# Patient Record
Sex: FEMALE | Race: BLACK OR AFRICAN AMERICAN | NOT HISPANIC OR LATINO
[De-identification: names, ages, dates, MRNs, and addresses within clinical notes are randomized per-mention and may not be internally consistent; named-entity substitution may affect disease eponyms.]

---

## 2017-12-21 PROBLEM — Z00.00 ENCOUNTER FOR PREVENTIVE HEALTH EXAMINATION: Status: ACTIVE | Noted: 2017-12-21

## 2017-12-28 ENCOUNTER — APPOINTMENT (OUTPATIENT)
Dept: ANTEPARTUM | Facility: CLINIC | Age: 27
End: 2017-12-28
Payer: COMMERCIAL

## 2017-12-28 ENCOUNTER — ASOB RESULT (OUTPATIENT)
Age: 27
End: 2017-12-28

## 2017-12-28 PROCEDURE — 76817 TRANSVAGINAL US OBSTETRIC: CPT

## 2017-12-28 PROCEDURE — 76811 OB US DETAILED SNGL FETUS: CPT

## 2018-01-11 ENCOUNTER — ASOB RESULT (OUTPATIENT)
Age: 28
End: 2018-01-11

## 2018-01-11 ENCOUNTER — APPOINTMENT (OUTPATIENT)
Dept: ANTEPARTUM | Facility: CLINIC | Age: 28
End: 2018-01-11
Payer: COMMERCIAL

## 2018-01-11 DIAGNOSIS — E66.01 OBESITY COMPLICATING PREGNANCY, SECOND TRIMESTER: ICD-10-CM

## 2018-01-11 DIAGNOSIS — O99.212 OBESITY COMPLICATING PREGNANCY, SECOND TRIMESTER: ICD-10-CM

## 2018-01-11 PROCEDURE — 76816 OB US FOLLOW-UP PER FETUS: CPT

## 2018-02-09 ENCOUNTER — APPOINTMENT (OUTPATIENT)
Dept: PEDIATRIC CARDIOLOGY | Facility: CLINIC | Age: 28
End: 2018-02-09
Payer: COMMERCIAL

## 2018-02-09 ENCOUNTER — OUTPATIENT (OUTPATIENT)
Dept: OUTPATIENT SERVICES | Age: 28
LOS: 1 days | Discharge: HOME | End: 2018-02-09

## 2018-02-09 PROCEDURE — 76825 ECHO EXAM OF FETAL HEART: CPT

## 2018-02-09 PROCEDURE — 99241 OFFICE CONSULTATION NEW/ESTAB PATIENT 15 MIN: CPT | Mod: 25

## 2018-02-09 PROCEDURE — 93325 DOPPLER ECHO COLOR FLOW MAPG: CPT | Mod: 59

## 2018-02-09 PROCEDURE — 76827 ECHO EXAM OF FETAL HEART: CPT

## 2018-02-09 PROCEDURE — 76820 UMBILICAL ARTERY ECHO: CPT

## 2018-03-23 ENCOUNTER — ASOB RESULT (OUTPATIENT)
Age: 28
End: 2018-03-23

## 2018-03-23 ENCOUNTER — APPOINTMENT (OUTPATIENT)
Dept: ANTEPARTUM | Facility: CLINIC | Age: 28
End: 2018-03-23
Payer: COMMERCIAL

## 2018-03-23 PROCEDURE — 76820 UMBILICAL ARTERY ECHO: CPT

## 2018-03-23 PROCEDURE — 76816 OB US FOLLOW-UP PER FETUS: CPT

## 2018-03-26 ENCOUNTER — APPOINTMENT (OUTPATIENT)
Dept: MATERNAL FETAL MEDICINE | Facility: CLINIC | Age: 28
End: 2018-03-26

## 2018-03-26 ENCOUNTER — APPOINTMENT (OUTPATIENT)
Dept: ANTEPARTUM | Facility: CLINIC | Age: 28
End: 2018-03-26
Payer: COMMERCIAL

## 2018-03-26 ENCOUNTER — ASOB RESULT (OUTPATIENT)
Age: 28
End: 2018-03-26

## 2018-03-26 DIAGNOSIS — Z86.2 PERSONAL HISTORY OF DISEASES OF THE BLOOD AND BLOOD-FORMING ORGANS AND CERTAIN DISORDERS INVOLVING THE IMMUNE MECHANISM: ICD-10-CM

## 2018-03-26 DIAGNOSIS — Z34.03 ENCOUNTER FOR SUPERVISION OF NORMAL FIRST PREGNANCY, THIRD TRIMESTER: ICD-10-CM

## 2018-03-26 DIAGNOSIS — Z78.9 OTHER SPECIFIED HEALTH STATUS: ICD-10-CM

## 2018-03-26 DIAGNOSIS — O99.019 ANEMIA COMPLICATING PREGNANCY, UNSPECIFIED TRIMESTER: ICD-10-CM

## 2018-03-26 DIAGNOSIS — O41.00X0 OLIGOHYDRAMNIOS, UNSPECIFIED TRIMESTER, NOT APPLICABLE OR UNSPECIFIED: ICD-10-CM

## 2018-03-26 PROCEDURE — 76818 FETAL BIOPHYS PROFILE W/NST: CPT

## 2018-03-26 RX ORDER — FERROUS SULFATE 325(65) MG
325 (65 FE) TABLET ORAL DAILY
Refills: 0 | Status: ACTIVE | COMMUNITY
Start: 2018-03-26

## 2018-04-02 ENCOUNTER — APPOINTMENT (OUTPATIENT)
Dept: ANTEPARTUM | Facility: CLINIC | Age: 28
End: 2018-04-02
Payer: COMMERCIAL

## 2018-04-02 ENCOUNTER — ASOB RESULT (OUTPATIENT)
Age: 28
End: 2018-04-02

## 2018-04-02 ENCOUNTER — APPOINTMENT (OUTPATIENT)
Dept: MATERNAL FETAL MEDICINE | Facility: CLINIC | Age: 28
End: 2018-04-02

## 2018-04-02 PROCEDURE — 76818 FETAL BIOPHYS PROFILE W/NST: CPT

## 2024-05-09 ENCOUNTER — APPOINTMENT (OUTPATIENT)
Dept: ANTEPARTUM | Facility: CLINIC | Age: 34
End: 2024-05-09
Payer: COMMERCIAL

## 2024-05-09 ENCOUNTER — LABORATORY RESULT (OUTPATIENT)
Age: 34
End: 2024-05-09

## 2024-05-09 ENCOUNTER — ASOB RESULT (OUTPATIENT)
Age: 34
End: 2024-05-09

## 2024-05-09 PROCEDURE — 36415 COLL VENOUS BLD VENIPUNCTURE: CPT

## 2024-05-09 PROCEDURE — 76813 OB US NUCHAL MEAS 1 GEST: CPT

## 2024-05-09 PROCEDURE — 76801 OB US < 14 WKS SINGLE FETUS: CPT

## 2024-07-05 ENCOUNTER — APPOINTMENT (OUTPATIENT)
Dept: ANTEPARTUM | Facility: CLINIC | Age: 34
End: 2024-07-05
Payer: COMMERCIAL

## 2024-07-05 ENCOUNTER — ASOB RESULT (OUTPATIENT)
Age: 34
End: 2024-07-05

## 2024-07-05 PROCEDURE — 76811 OB US DETAILED SNGL FETUS: CPT

## 2024-08-23 ENCOUNTER — ASOB RESULT (OUTPATIENT)
Age: 34
End: 2024-08-23

## 2024-08-23 ENCOUNTER — APPOINTMENT (OUTPATIENT)
Dept: ANTEPARTUM | Facility: CLINIC | Age: 34
End: 2024-08-23

## 2024-08-23 PROCEDURE — 76816 OB US FOLLOW-UP PER FETUS: CPT

## 2024-08-23 PROCEDURE — 76819 FETAL BIOPHYS PROFIL W/O NST: CPT | Mod: 59

## 2024-09-19 ENCOUNTER — APPOINTMENT (OUTPATIENT)
Dept: ANTEPARTUM | Facility: CLINIC | Age: 34
End: 2024-09-19

## 2024-10-17 ENCOUNTER — APPOINTMENT (OUTPATIENT)
Dept: ANTEPARTUM | Facility: CLINIC | Age: 34
End: 2024-10-17

## 2024-10-17 ENCOUNTER — OUTPATIENT (OUTPATIENT)
Dept: INPATIENT UNIT | Facility: HOSPITAL | Age: 34
LOS: 1 days | Discharge: HOME | End: 2024-10-17
Payer: COMMERCIAL

## 2024-10-17 VITALS
HEART RATE: 89 BPM | DIASTOLIC BLOOD PRESSURE: 82 MMHG | RESPIRATION RATE: 16 BRPM | SYSTOLIC BLOOD PRESSURE: 142 MMHG | TEMPERATURE: 98 F

## 2024-10-17 VITALS — HEART RATE: 79 BPM | DIASTOLIC BLOOD PRESSURE: 88 MMHG | SYSTOLIC BLOOD PRESSURE: 134 MMHG

## 2024-10-17 DIAGNOSIS — O26.899 OTHER SPECIFIED PREGNANCY RELATED CONDITIONS, UNSPECIFIED TRIMESTER: ICD-10-CM

## 2024-10-17 DIAGNOSIS — Z98.84 BARIATRIC SURGERY STATUS: Chronic | ICD-10-CM

## 2024-10-17 LAB
ALBUMIN SERPL ELPH-MCNC: 3.2 G/DL — LOW (ref 3.3–5)
ALP SERPL-CCNC: 85 U/L — SIGNIFICANT CHANGE UP (ref 40–120)
ALT FLD-CCNC: 9 U/L — SIGNIFICANT CHANGE UP (ref 4–33)
ANION GAP SERPL CALC-SCNC: 9 MMOL/L — SIGNIFICANT CHANGE UP (ref 7–14)
APPEARANCE UR: ABNORMAL
AST SERPL-CCNC: 20 U/L — SIGNIFICANT CHANGE UP (ref 4–32)
BACTERIA # UR AUTO: ABNORMAL /HPF
BASOPHILS # BLD AUTO: 0.03 K/UL — SIGNIFICANT CHANGE UP (ref 0–0.2)
BASOPHILS NFR BLD AUTO: 0.5 % — SIGNIFICANT CHANGE UP (ref 0–2)
BILIRUB SERPL-MCNC: <0.2 MG/DL — SIGNIFICANT CHANGE UP (ref 0.2–1.2)
BILIRUB UR-MCNC: NEGATIVE — SIGNIFICANT CHANGE UP
BUN SERPL-MCNC: 6 MG/DL — LOW (ref 7–23)
CALCIUM SERPL-MCNC: 8.5 MG/DL — SIGNIFICANT CHANGE UP (ref 8.4–10.5)
CAST: 0 /LPF — SIGNIFICANT CHANGE UP (ref 0–4)
CHLORIDE SERPL-SCNC: 103 MMOL/L — SIGNIFICANT CHANGE UP (ref 98–107)
CO2 SERPL-SCNC: 22 MMOL/L — SIGNIFICANT CHANGE UP (ref 22–31)
COLOR SPEC: YELLOW — SIGNIFICANT CHANGE UP
CREAT ?TM UR-MCNC: 62 MG/DL — SIGNIFICANT CHANGE UP
CREAT SERPL-MCNC: 0.66 MG/DL — SIGNIFICANT CHANGE UP (ref 0.5–1.3)
DIFF PNL FLD: NEGATIVE — SIGNIFICANT CHANGE UP
EGFR: 117 ML/MIN/1.73M2 — SIGNIFICANT CHANGE UP
EOSINOPHIL # BLD AUTO: 0.06 K/UL — SIGNIFICANT CHANGE UP (ref 0–0.5)
EOSINOPHIL NFR BLD AUTO: 1 % — SIGNIFICANT CHANGE UP (ref 0–6)
GLUCOSE SERPL-MCNC: 65 MG/DL — LOW (ref 70–99)
GLUCOSE UR QL: NEGATIVE MG/DL — SIGNIFICANT CHANGE UP
HCT VFR BLD CALC: 26.4 % — LOW (ref 34.5–45)
HGB BLD-MCNC: 8.4 G/DL — LOW (ref 11.5–15.5)
IANC: 3.67 K/UL — SIGNIFICANT CHANGE UP (ref 1.8–7.4)
IMM GRANULOCYTES NFR BLD AUTO: 0.2 % — SIGNIFICANT CHANGE UP (ref 0–0.9)
KETONES UR-MCNC: NEGATIVE MG/DL — SIGNIFICANT CHANGE UP
LDH SERPL L TO P-CCNC: 228 U/L — HIGH (ref 135–225)
LEUKOCYTE ESTERASE UR-ACNC: NEGATIVE — SIGNIFICANT CHANGE UP
LYMPHOCYTES # BLD AUTO: 1.78 K/UL — SIGNIFICANT CHANGE UP (ref 1–3.3)
LYMPHOCYTES # BLD AUTO: 29.6 % — SIGNIFICANT CHANGE UP (ref 13–44)
MCHC RBC-ENTMCNC: 27 PG — SIGNIFICANT CHANGE UP (ref 27–34)
MCHC RBC-ENTMCNC: 31.8 GM/DL — LOW (ref 32–36)
MCV RBC AUTO: 84.9 FL — SIGNIFICANT CHANGE UP (ref 80–100)
MONOCYTES # BLD AUTO: 0.46 K/UL — SIGNIFICANT CHANGE UP (ref 0–0.9)
MONOCYTES NFR BLD AUTO: 7.7 % — SIGNIFICANT CHANGE UP (ref 2–14)
NEUTROPHILS # BLD AUTO: 3.67 K/UL — SIGNIFICANT CHANGE UP (ref 1.8–7.4)
NEUTROPHILS NFR BLD AUTO: 61 % — SIGNIFICANT CHANGE UP (ref 43–77)
NITRITE UR-MCNC: NEGATIVE — SIGNIFICANT CHANGE UP
NRBC # BLD: 0 /100 WBCS — SIGNIFICANT CHANGE UP (ref 0–0)
NRBC # FLD: 0 K/UL — SIGNIFICANT CHANGE UP (ref 0–0)
PH UR: 7 — SIGNIFICANT CHANGE UP (ref 5–8)
PLATELET # BLD AUTO: 253 K/UL — SIGNIFICANT CHANGE UP (ref 150–400)
POTASSIUM SERPL-MCNC: 3.5 MMOL/L — SIGNIFICANT CHANGE UP (ref 3.5–5.3)
POTASSIUM SERPL-SCNC: 3.5 MMOL/L — SIGNIFICANT CHANGE UP (ref 3.5–5.3)
PROT ?TM UR-MCNC: 6 MG/DL — SIGNIFICANT CHANGE UP
PROT SERPL-MCNC: 6.2 G/DL — SIGNIFICANT CHANGE UP (ref 6–8.3)
PROT UR-MCNC: NEGATIVE MG/DL — SIGNIFICANT CHANGE UP
PROT/CREAT UR-RTO: 0.1 RATIO — SIGNIFICANT CHANGE UP (ref 0–0.2)
RBC # BLD: 3.11 M/UL — LOW (ref 3.8–5.2)
RBC # FLD: 15.3 % — HIGH (ref 10.3–14.5)
RBC CASTS # UR COMP ASSIST: 0 /HPF — SIGNIFICANT CHANGE UP (ref 0–4)
SODIUM SERPL-SCNC: 134 MMOL/L — LOW (ref 135–145)
SP GR SPEC: 1.01 — SIGNIFICANT CHANGE UP (ref 1–1.03)
SQUAMOUS # UR AUTO: 8 /HPF — HIGH (ref 0–5)
URATE SERPL-MCNC: 3.8 MG/DL — SIGNIFICANT CHANGE UP (ref 2.5–7)
UROBILINOGEN FLD QL: 0.2 MG/DL — SIGNIFICANT CHANGE UP (ref 0.2–1)
WBC # BLD: 6.01 K/UL — SIGNIFICANT CHANGE UP (ref 3.8–10.5)
WBC # FLD AUTO: 6.01 K/UL — SIGNIFICANT CHANGE UP (ref 3.8–10.5)
WBC UR QL: 2 /HPF — SIGNIFICANT CHANGE UP (ref 0–5)

## 2024-10-17 PROCEDURE — 59025 FETAL NON-STRESS TEST: CPT | Mod: 26

## 2024-10-17 PROCEDURE — 99221 1ST HOSP IP/OBS SF/LOW 40: CPT | Mod: 25

## 2024-10-17 NOTE — OB PROVIDER TRIAGE NOTE - NSOBPROVIDERNOTE_OBGYN_ALL_OB_FT
[facility] patient is a 33 y/o EDC 2024 EGA 36   encounter for elevated blood pressure in office [facility] patient is a 35 y/o EDC 2024 EGA 36   encounter for elevated blood pressure in office     1600p received report from LINDA Villarreal   [facility] labs normal   BP  no severe range BP    awaiting  call back from Dr Peralta    BP monitoring and observation ''    1715 p d/w Dr Peralta   cleared for [facility] patient is a 33 y/o EDC 2024 EGA 36   encounter for elevated blood pressure in office     1600p received report from LINDA Coon labs normal   BP  no severe range BP    awaiting  call back from Dr Peralta    BP monitoring and observation '    1715 p d/w Dr Peralta   cleared for discharge   instructed to do 24 U collection     - Patient to be discharged home with follow up and return precautions  - Please follow up with your obstetrician at your next scheduled appointment.   - Please return for decreased/no fetal movement, vaginal bleeding similar to that of a period, leaking/gush of fluid, regular contractions occurring 4-5 minutes for one hour or requiring pain medication   - Patient and partner and educated of plan and demonstrate understanding. All questions answered. Discharge instructions provided and signed.   - Discharged at ___ 1560p

## 2024-10-17 NOTE — OB PROVIDER TRIAGE NOTE - HISTORY OF PRESENT ILLNESS
[facility] patient is a 35 y/o EDC 2024 EGA 36   reports of elevated blood pressure in the office this morning. Patient denies headache, visual disturbances, nausea/vomiting, epigastric pain. Patient reports of fetal movement. Denies loss of fluid, vaginal bleeding, cramping, contractions.     Antepartum History:  - Anemia- Iron infusions x 5  ( >4 weeks)

## 2024-10-17 NOTE — OB PROVIDER TRIAGE NOTE - NSHPLABSRESULTS_GEN_ALL_CORE
8.4    6.01  )-----------( 253      ( 17 Oct 2024 13:50 )             26.4     10-17    134[L]  |  103  |  6[L]  ----------------------------<  65[L]  3.5   |  22  |  0.66    Ca    8.5      17 Oct 2024 13:50    TPro  6.2  /  Alb  3.2[L]  /  TBili  <0.2  /  DBili  x   /  AST  20  /  ALT  9   /  AlkPhos  85  10-17    PCR.1

## 2024-10-17 NOTE — OB RN TRIAGE NOTE - FALL HARM RISK - CONCLUSION
utilizes the maternal blood to test for fetal cells. These fetal cells are then karyotyped for genetic evaluation. All of these tests, CVS, NIPT and amniocentesis, let couples know if the fetus will have genetic abnormalities, and will help them make informed decisions regarding their pregnancy. Karyotyping by either CVS, NIPT or Amniocentesis is the ONLY way to confirm the genetic chromosomal structure regarding trisomy; Down's Syndrome, Edward's or Pateau's. [facility] was reviewed. If NIPT is positive then a confirmatory amniocentesis would be recommended to confirm the diagnosis. [facility] guidelines were reviewed. Spontaneous  10/18/2022     Priority: High    Family history of MTHFR deficiency 2021     Priority: High    Polycystic ovaries 2023     Priority: Medium    Hx of cold sores 2023     Priority: Medium    39 weeks gestation of pregnancy 2023    Family history of Downs syndrome 2023     FOB 1st cousin        MTHFR mutation Heterozygous C665T and [id number] 2021     MTHFR Mutation C665T Abnormal  2021 12:36 PM [facility]   Heterozygous    MTHFR Mutation C6990B Abnormal  2021 12:36 PM [facility]   Heterozygous               Plan discussed with Dr. Brant Marquez, who is agreeable. Steroids given this admission: No    Risks, benefits, alternatives and possible complications have been discussed in detail with the patient. Admission, and post admission procedures and expectations were discussed in detail. All questions were answered. Attending's Name: Dr. Nicko Sidhu DO  Ob/Gyn Resident  2023, 10:44 PM    Resident Physician Statement  I have discussed the case, including pertinent history and exam findings with the above chuyita resident. I have personally seen the patient. I agree with the assessment, plan and orders as documented. I have made changes to the above note as needed. I have discussed the case with above named attending.      [facility]
Universal Safety Interventions

## 2024-10-17 NOTE — OB PROVIDER TRIAGE NOTE - NSHPPHYSICALEXAM_GEN_ALL_CORE
T(C): 36.8 (10-17-24 @ 13:46), Max: 36.8 (10-17-24 @ 13:29)  HR: 82 (10-17-24 @ 14:23) (82 - 89)  BP: 124/71 (10-17-24 @ 14:23) (124/71 - 142/82)  RR: 16 (10-17-24 @ 13:46) (16 - 16)    General: Female sitting comfortably   Head: Normocephalic. Atraumatic.   Eyes: No discharge, lids normal, conjunctiva normal  Lungs: No respiratory distress  Abdomen: Soft, nontender. Gravid. No contractions on palpation  TAUS:  Sono saved in ASOB.   NST:   CTX: none  Neuro: No facial asymmetry, no slurred speech, moves all 4 extremities  Mood: Alert and lucid, appropriate mood and affect  SSE: Vital Signs Last 24 Hrs  T(C): 36.8 (17 Oct 2024 13:46), Max: 36.8 (17 Oct 2024 13:29)  T(F): 98.24 (17 Oct 2024 13:46), Max: 98.24 (17 Oct 2024 13:46)  HR: 85 (17 Oct 2024 15:24) (82 - 89)  BP: 132/90 (17 Oct 2024 15:24) (124/71 - 146/83)  RR: 16 (17 Oct 2024 13:46) (16 - 16)    General: Female sitting comfortably   Head: Normocephalic. Atraumatic.   Eyes: No discharge, lids normal, conjunctiva normal  Lungs: No respiratory distress  Abdomen: Soft, nontender. Gravid. No contractions on palpation  TAUS:  Sono saved in ASOB.   NST: 140 baseline with moderate variability, accelerations present, no decelerations, reactive   CTX: none  Neuro: No facial asymmetry, no slurred speech, moves all 4 extremities  Mood: Alert and lucid, appropriate mood and affect Vital Signs Last 24 Hrs  T(C): 36.8 (17 Oct 2024 13:46), Max: 36.8 (17 Oct 2024 13:29)  T(F): 98.24 (17 Oct 2024 13:46), Max: 98.24 (17 Oct 2024 13:46)  HR: 85 (17 Oct 2024 15:24) (82 - 89)  BP: 132/90 (17 Oct 2024 15:24) (124/71 - 146/83)  RR: 16 (17 Oct 2024 13:46) (16 - 16)    General: Female sitting comfortably   Head: Normocephalic. Atraumatic.   Eyes: No discharge, lids normal, conjunctiva normal  Lungs: No respiratory distress  Abdomen: Soft, nontender. Gravid. No contractions on palpation  TAUS:  Sono saved in ASOB. cephalic, posterior placenta, m-mode 147, ZAHIRA 13.33, 8/8 BPP  NST: 140 baseline with moderate variability, accelerations present, no decelerations, reactive   CTX: none  Neuro: No facial asymmetry, no slurred speech, moves all 4 extremities  Mood: Alert and lucid, appropriate mood and affect

## 2024-10-21 DIAGNOSIS — O09.523 SUPERVISION OF ELDERLY MULTIGRAVIDA, THIRD TRIMESTER: ICD-10-CM

## 2024-10-21 DIAGNOSIS — O13.3 GESTATIONAL [PREGNANCY-INDUCED] HYPERTENSION WITHOUT SIGNIFICANT PROTEINURIA, THIRD TRIMESTER: ICD-10-CM

## 2024-10-21 DIAGNOSIS — Z3A.36 36 WEEKS GESTATION OF PREGNANCY: ICD-10-CM

## 2024-10-21 DIAGNOSIS — O99.013 ANEMIA COMPLICATING PREGNANCY, THIRD TRIMESTER: ICD-10-CM

## 2024-10-21 DIAGNOSIS — D64.9 ANEMIA, UNSPECIFIED: ICD-10-CM

## 2024-10-21 DIAGNOSIS — O99.843 BARIATRIC SURGERY STATUS COMPLICATING PREGNANCY, THIRD TRIMESTER: ICD-10-CM

## 2024-10-28 ENCOUNTER — APPOINTMENT (OUTPATIENT)
Dept: ANTEPARTUM | Facility: CLINIC | Age: 34
End: 2024-10-28

## 2024-10-28 ENCOUNTER — INPATIENT (INPATIENT)
Facility: HOSPITAL | Age: 34
LOS: 3 days | Discharge: HOME | End: 2024-11-01
Attending: STUDENT IN AN ORGANIZED HEALTH CARE EDUCATION/TRAINING PROGRAM | Admitting: STUDENT IN AN ORGANIZED HEALTH CARE EDUCATION/TRAINING PROGRAM
Payer: COMMERCIAL

## 2024-10-28 VITALS
HEART RATE: 81 BPM | TEMPERATURE: 98 F | SYSTOLIC BLOOD PRESSURE: 183 MMHG | RESPIRATION RATE: 16 BRPM | DIASTOLIC BLOOD PRESSURE: 102 MMHG

## 2024-10-28 DIAGNOSIS — O26.899 OTHER SPECIFIED PREGNANCY RELATED CONDITIONS, UNSPECIFIED TRIMESTER: ICD-10-CM

## 2024-10-28 DIAGNOSIS — Z98.84 BARIATRIC SURGERY STATUS: Chronic | ICD-10-CM

## 2024-10-28 LAB
ALBUMIN SERPL ELPH-MCNC: 3.5 G/DL — SIGNIFICANT CHANGE UP (ref 3.3–5)
ALP SERPL-CCNC: 104 U/L — SIGNIFICANT CHANGE UP (ref 40–120)
ALT FLD-CCNC: 10 U/L — SIGNIFICANT CHANGE UP (ref 4–33)
ANION GAP SERPL CALC-SCNC: 14 MMOL/L — SIGNIFICANT CHANGE UP (ref 7–14)
APPEARANCE UR: ABNORMAL
AST SERPL-CCNC: 24 U/L — SIGNIFICANT CHANGE UP (ref 4–32)
BACTERIA # UR AUTO: ABNORMAL /HPF
BASOPHILS # BLD AUTO: 0.03 K/UL — SIGNIFICANT CHANGE UP (ref 0–0.2)
BASOPHILS NFR BLD AUTO: 0.5 % — SIGNIFICANT CHANGE UP (ref 0–2)
BILIRUB SERPL-MCNC: 0.2 MG/DL — SIGNIFICANT CHANGE UP (ref 0.2–1.2)
BILIRUB UR-MCNC: NEGATIVE — SIGNIFICANT CHANGE UP
BLD GP AB SCN SERPL QL: POSITIVE — SIGNIFICANT CHANGE UP
BUN SERPL-MCNC: 7 MG/DL — SIGNIFICANT CHANGE UP (ref 7–23)
CALCIUM SERPL-MCNC: 9 MG/DL — SIGNIFICANT CHANGE UP (ref 8.4–10.5)
CAST: 2 /LPF — SIGNIFICANT CHANGE UP (ref 0–4)
CHLORIDE SERPL-SCNC: 102 MMOL/L — SIGNIFICANT CHANGE UP (ref 98–107)
CO2 SERPL-SCNC: 22 MMOL/L — SIGNIFICANT CHANGE UP (ref 22–31)
COLOR SPEC: YELLOW — SIGNIFICANT CHANGE UP
CREAT ?TM UR-MCNC: 219 MG/DL — SIGNIFICANT CHANGE UP
CREAT SERPL-MCNC: 0.7 MG/DL — SIGNIFICANT CHANGE UP (ref 0.5–1.3)
DIFF PNL FLD: NEGATIVE — SIGNIFICANT CHANGE UP
EGFR: 115 ML/MIN/1.73M2 — SIGNIFICANT CHANGE UP
EOSINOPHIL # BLD AUTO: 0.04 K/UL — SIGNIFICANT CHANGE UP (ref 0–0.5)
EOSINOPHIL NFR BLD AUTO: 0.7 % — SIGNIFICANT CHANGE UP (ref 0–6)
GLUCOSE SERPL-MCNC: 88 MG/DL — SIGNIFICANT CHANGE UP (ref 70–99)
GLUCOSE UR QL: NEGATIVE MG/DL — SIGNIFICANT CHANGE UP
HCT VFR BLD CALC: 32 % — LOW (ref 34.5–45)
HGB BLD-MCNC: 10.1 G/DL — LOW (ref 11.5–15.5)
IANC: 3.9 K/UL — SIGNIFICANT CHANGE UP (ref 1.8–7.4)
IMM GRANULOCYTES NFR BLD AUTO: 0.2 % — SIGNIFICANT CHANGE UP (ref 0–0.9)
KETONES UR-MCNC: NEGATIVE MG/DL — SIGNIFICANT CHANGE UP
LDH SERPL L TO P-CCNC: 298 U/L — HIGH (ref 135–225)
LEUKOCYTE ESTERASE UR-ACNC: NEGATIVE — SIGNIFICANT CHANGE UP
LYMPHOCYTES # BLD AUTO: 1.54 K/UL — SIGNIFICANT CHANGE UP (ref 1–3.3)
LYMPHOCYTES # BLD AUTO: 26 % — SIGNIFICANT CHANGE UP (ref 13–44)
MCHC RBC-ENTMCNC: 26.9 PG — LOW (ref 27–34)
MCHC RBC-ENTMCNC: 31.6 GM/DL — LOW (ref 32–36)
MCV RBC AUTO: 85.3 FL — SIGNIFICANT CHANGE UP (ref 80–100)
MONOCYTES # BLD AUTO: 0.4 K/UL — SIGNIFICANT CHANGE UP (ref 0–0.9)
MONOCYTES NFR BLD AUTO: 6.8 % — SIGNIFICANT CHANGE UP (ref 2–14)
NEUTROPHILS # BLD AUTO: 3.9 K/UL — SIGNIFICANT CHANGE UP (ref 1.8–7.4)
NEUTROPHILS NFR BLD AUTO: 65.8 % — SIGNIFICANT CHANGE UP (ref 43–77)
NITRITE UR-MCNC: NEGATIVE — SIGNIFICANT CHANGE UP
NRBC # BLD: 0 /100 WBCS — SIGNIFICANT CHANGE UP (ref 0–0)
NRBC # FLD: 0 K/UL — SIGNIFICANT CHANGE UP (ref 0–0)
PH UR: 6.5 — SIGNIFICANT CHANGE UP (ref 5–8)
PLATELET # BLD AUTO: 304 K/UL — SIGNIFICANT CHANGE UP (ref 150–400)
POTASSIUM SERPL-MCNC: 3.6 MMOL/L — SIGNIFICANT CHANGE UP (ref 3.5–5.3)
POTASSIUM SERPL-SCNC: 3.6 MMOL/L — SIGNIFICANT CHANGE UP (ref 3.5–5.3)
PROT ?TM UR-MCNC: 24 MG/DL — SIGNIFICANT CHANGE UP
PROT SERPL-MCNC: 7 G/DL — SIGNIFICANT CHANGE UP (ref 6–8.3)
PROT UR-MCNC: SIGNIFICANT CHANGE UP MG/DL
PROT/CREAT UR-RTO: 0.1 RATIO — SIGNIFICANT CHANGE UP (ref 0–0.2)
RBC # BLD: 3.75 M/UL — LOW (ref 3.8–5.2)
RBC # FLD: 15.1 % — HIGH (ref 10.3–14.5)
RBC CASTS # UR COMP ASSIST: 2 /HPF — SIGNIFICANT CHANGE UP (ref 0–4)
REVIEW: SIGNIFICANT CHANGE UP
RH IG SCN BLD-IMP: NEGATIVE — SIGNIFICANT CHANGE UP
RH IG SCN BLD-IMP: NEGATIVE — SIGNIFICANT CHANGE UP
SODIUM SERPL-SCNC: 138 MMOL/L — SIGNIFICANT CHANGE UP (ref 135–145)
SP GR SPEC: 1.02 — SIGNIFICANT CHANGE UP (ref 1–1.03)
SQUAMOUS # UR AUTO: 26 /HPF — HIGH (ref 0–5)
URATE SERPL-MCNC: 4.1 MG/DL — SIGNIFICANT CHANGE UP (ref 2.5–7)
UROBILINOGEN FLD QL: 1 MG/DL — SIGNIFICANT CHANGE UP (ref 0.2–1)
WBC # BLD: 5.92 K/UL — SIGNIFICANT CHANGE UP (ref 3.8–10.5)
WBC # FLD AUTO: 5.92 K/UL — SIGNIFICANT CHANGE UP (ref 3.8–10.5)
WBC UR QL: 4 /HPF — SIGNIFICANT CHANGE UP (ref 0–5)

## 2024-10-28 PROCEDURE — 86077 PHYS BLOOD BANK SERV XMATCH: CPT

## 2024-10-28 RX ORDER — OXYTOCIN IN D5W-0.2% SODIUM CL 15/250 ML
167 PLASTIC BAG, INJECTION (ML) INTRAVENOUS
Qty: 30 | Refills: 0 | Status: COMPLETED | OUTPATIENT
Start: 2024-10-28 | End: 2024-10-28

## 2024-10-28 RX ORDER — CHLORHEXIDINE GLUCONATE 40 MG/ML
1 SOLUTION TOPICAL DAILY
Refills: 0 | Status: DISCONTINUED | OUTPATIENT
Start: 2024-10-28 | End: 2024-10-30

## 2024-10-28 RX ORDER — NIFEDIPINE 90 MG
10 TABLET, EXTENDED RELEASE 24 HR ORAL ONCE
Refills: 0 | Status: COMPLETED | OUTPATIENT
Start: 2024-10-28 | End: 2024-10-28

## 2024-10-28 RX ORDER — MAGNESIUM SULFATE IN 0.9% NACL 2 G/50 ML
2 INTRAVENOUS SOLUTION, PIGGYBACK (ML) INTRAVENOUS
Qty: 40 | Refills: 0 | Status: COMPLETED | OUTPATIENT
Start: 2024-10-28 | End: 2024-10-29

## 2024-10-28 RX ORDER — INFLUENZ VIR VAC TV P-SURF2003 15MCG/.5ML
0.5 SYRINGE (ML) INTRAMUSCULAR ONCE
Refills: 0 | Status: DISCONTINUED | OUTPATIENT
Start: 2024-10-28 | End: 2024-11-01

## 2024-10-28 RX ORDER — AMPICILLIN 2 G/1
2 INJECTION, POWDER, FOR SOLUTION INTRAVENOUS ONCE
Refills: 0 | Status: COMPLETED | OUTPATIENT
Start: 2024-10-28 | End: 2024-10-28

## 2024-10-28 RX ORDER — AMPICILLIN 2 G/1
1 INJECTION, POWDER, FOR SOLUTION INTRAVENOUS EVERY 4 HOURS
Refills: 0 | Status: DISCONTINUED | OUTPATIENT
Start: 2024-10-28 | End: 2024-10-30

## 2024-10-28 RX ORDER — CITRIC ACID/SODIUM CITRATE 334-500MG
15 SOLUTION, ORAL ORAL EVERY 6 HOURS
Refills: 0 | Status: DISCONTINUED | OUTPATIENT
Start: 2024-10-28 | End: 2024-10-30

## 2024-10-28 RX ORDER — NIFEDIPINE 90 MG
30 TABLET, EXTENDED RELEASE 24 HR ORAL DAILY
Refills: 0 | Status: DISCONTINUED | OUTPATIENT
Start: 2024-10-28 | End: 2024-10-30

## 2024-10-28 RX ORDER — MAGNESIUM SULFATE IN 0.9% NACL 2 G/50 ML
4 INTRAVENOUS SOLUTION, PIGGYBACK (ML) INTRAVENOUS ONCE
Refills: 0 | Status: COMPLETED | OUTPATIENT
Start: 2024-10-28 | End: 2024-10-28

## 2024-10-28 RX ADMIN — Medication 50 GM/HR: at 19:20

## 2024-10-28 RX ADMIN — Medication 30 MILLIGRAM(S): at 18:09

## 2024-10-28 RX ADMIN — Medication 125 MILLILITER(S): at 19:05

## 2024-10-28 RX ADMIN — Medication 300 GRAM(S): at 18:25

## 2024-10-28 RX ADMIN — Medication 50 GM/HR: at 18:52

## 2024-10-28 RX ADMIN — CHLORHEXIDINE GLUCONATE 1 APPLICATION(S): 40 SOLUTION TOPICAL at 21:30

## 2024-10-28 RX ADMIN — Medication 10 MILLIGRAM(S): at 18:06

## 2024-10-28 RX ADMIN — AMPICILLIN 200 GRAM(S): 2 INJECTION, POWDER, FOR SOLUTION INTRAVENOUS at 23:04

## 2024-10-28 NOTE — OB PROVIDER H&P - NSHPPHYSICALEXAM_GEN_ALL_CORE
Vital Signs Last 24 Hrs  T(C): 36.6 (28 Oct 2024 21:30), Max: 36.8 (28 Oct 2024 17:28)  T(F): 97.88 (28 Oct 2024 21:30), Max: 98.24 (28 Oct 2024 18:45)  HR: 91 (28 Oct 2024 22:02) (77 - 118)  BP: 167/96 (28 Oct 2024 21:54) (137/82 - 183/111)  BP(mean): --  RR: 18 (28 Oct 2024 21:30) (14 - 18)  SpO2: 93% (28 Oct 2024 22:02) (89% - 100%)    Parameters below as of 28 Oct 2024 18:31  Patient On (Oxygen Delivery Method): room air        SVE: 0/0/-3  FHT: 140 bpm/mod diana/+ accels/- decels   [place]: acontractile   Sono: Vertex

## 2024-10-28 NOTE — OB PROVIDER H&P - ASSESSMENT
INCOMPLETE NOTE     HPI: Pt is a 34y   @ 37w5d  presenting for X.  Fetal movement (+)  Leakage of fluid (-)  Contractions (-)  Vaginal bleeding (-)  GBS pos/neg  Estimated fetal weight:    Prenatal care complicated by     OBHx:  NSCD 2018 FT 5#_, IOL for gHTN   GynHx: Denies any gynecologic issues  PMHx: Anemia   PSHx: Bariatric surgery (),   Med: PNV  All: NKDA  Psych: Denies hx of mental health issues  SH: Denies hx of smoking, drinking, or drug usage during the pregnancy    Vital Signs Last 24 Hrs  T(C): 36.6 (28 Oct 2024 21:30), Max: 36.8 (28 Oct 2024 17:28)  T(F): 97.88 (28 Oct 2024 21:30), Max: 98.24 (28 Oct 2024 18:45)  HR: 91 (28 Oct 2024 22:02) (77 - 118)  BP: 167/96 (28 Oct 2024 21:54) (137/82 - 183/111)  BP(mean): --  RR: 18 (28 Oct 2024 21:30) (14 - 18)  SpO2: 93% (28 Oct 2024 22:02) (89% - 100%)    Parameters below as of 28 Oct 2024 18:31  Patient On (Oxygen Delivery Method): room air        SVE: 0/0/-3  FHT: 140 bpm/mod diana/+ accels/- decels   Accident: acontractile   Sono: Vertex    A/P: Pt is a 34y  @ 37w5d who presents for IOL for PEC->sPEC on admission with sustained severe range blood pressures     1. Admit to Labor and Delivery. Routine Labs. IV Fluids  2. IOL with BC/CB  3. Fetus: Vertex, Reactive/Continue fetal monitoring  4. Mg for seizure prophylaxis, Pro 30 QD, s/p Pro 10 IR (10/28)  5. GBS pos, for Amp  6. Pain: IV pain meds/epidural PRN    Ashley Sacks, PGY 1  Obstetrics and Gynecology    Discussed with Dr. Roberts        A/P: Pt is a 34y  @ 37w5d who presents for IOL for PEC->sPEC on admission with sustained severe range blood pressures     1. Admit to Labor and Delivery. Routine Labs. IV Fluids  2. IOL with BC/CB  3. Fetus: Vertex, Reactive/Continue fetal monitoring  4. Mg for seizure prophylaxis, Pro 30 QD, s/p Pro 10 IR (10/28)  5. GBS pos, for Amp  6. Pain: IV pain meds/epidural PRN    Ashley Sacks, PGY 1  Obstetrics and Gynecology    Discussed with Dr. Roberts        A/P: Pt is a 34y  @ 37w5d who presents for IOL for gHTN->sPEC on admission with sustained severe range blood pressures     1. Admit to Labor and Delivery. Routine/HELLP Labs. IV Fluids  2. IOL with BC/CB  3. Fetus: Vertex, Reactive/Continue fetal monitoring  4. Mg for seizure prophylaxis, Pro 30 QD, s/p Pro 10 IR (10/28)  5. GBS pos, for Amp  6. Pain: IV pain meds/epidural PRN  7. 2u on hold     Ashley Sacks, PGY 1  Obstetrics and Gynecology    Discussed with Dr. Roberts

## 2024-10-28 NOTE — PROVIDER CONTACT NOTE (OTHER) - BACKGROUND
IOL for proteinuria - elevated BP in waiting room and met criteria for severe preeclampsia.   Currently on Magnesium Sulfate infusion  received Procardia 10 IR at 1806 and 30 Procardia ER at 1809

## 2024-10-28 NOTE — OB RN TRIAGE NOTE - NSSDOHTHREATEN_OBGYN_A_OB
never Quality 226: Preventive Care And Screening: Tobacco Use: Screening And Cessation Intervention: Patient screened for tobacco use and is an ex/non-smoker Detail Level: Detailed Quality 130: Documentation Of Current Medications In The Medical Record: Current Medications Documented Quality 110: Preventive Care And Screening: Influenza Immunization: Influenza immunization was not ordered or administered, reason not given

## 2024-10-28 NOTE — OB PROVIDER H&P - NS_EFW_OBGYN_ALL_OB_FT
Hypoglycemia (Low Blood Sugar)     Fast-acting sugar includes a cup of nonfat milk.     Too little sugar (glucose) in your blood is called hypoglycemia or low blood sugar. Low blood sugar usually means anything lower than 70 mg/dL. Talk with your healthcare provider about your target range and what level is too low for you. Diabetes itself doesnt cause low blood sugar. But some of the treatments for diabetes, such as pills or insulin, may raise your risk for it. Low blood sugar may cause you to pass out or have a seizure. So always treat low blood sugar right away, but don't overeat.  Special note: Always carry a source of fast-acting sugar and a snack in case of hypoglycemia.   What you may notice  If you have low blood sugar, you may have one or more of these symptoms:  · Shakiness or dizziness  · Cold, clammy skin or sweating  · Feelings of hunger  · Headache  · Nervousness  · A hard, fast heartbeat  · Weakness  · Confusion or irritability  · Blurred vision  · Having nightmares or waking up confused or sweating  · Numbness or tingling in the lips or tongue  What you should do  Here are tips to follow if you have hypoglycemia:   · First check your blood sugar. If it is too low (out of your target range), eat or drink 15 to 20 grams of fast-acting sugar. This may be 3 to 4 glucose tablets, 4 ounces (half a cup) of fruit juice or regular (nondiet) soda, 8 ounces (1 cup) of fat-free milk, or 1 tablespoon of honey. Dont take more than this, or your blood sugar may go too high.  · Wait 15 minutes. Then recheck your blood sugar if you can.  · If your blood sugar is still too low, repeat the steps above and check your blood sugar again. If your blood sugar still has not returned to your target range, contact your healthcare provider or seek emergency care.  · Once your blood sugar returns to target range, eat a snack or meal.  Preventing low blood sugar  Things you can do include the following:   · If your condition  needs a strict treatment plan, eat your meals and snacks at the same times each day. Dont skip meals!  · If your treatment plan lets you change when you eat and what you eat, learn how to change the time and dose of your rapid-acting insulin to match this.   · Ask your healthcare provider if it is safe for you to drink alcohol. Never drink on an empty stomach.  · Take your medicine at the prescribed times.  · Always carry a source of fast-acting sugar and a snack when youre away from home.  Other things to do  Additional tips include the following:  · Carry a medical ID card, a compact USB drive, or wear a medical alert bracelet or necklace. It should say that you have diabetes. It should also say what to do if you pass out or have a seizure.  · Make sure your family, friends, and coworkers know the signs of low blood sugar. Tell them what to do if your blood sugar falls very low and you cant treat yourself.  · Keep a glucagon emergency kit handy. Be sure your family, friends, and coworkers know how and when to use it. Check it regularly and replace the glucagon before it expires.  · Talk with your health care team about other things you can do to prevent low blood sugar.     If you have unexplained hypoglycemia or hypoglycemia several times, call your healthcare provider.   Date Last Reviewed: 5/1/2016  © 5967-2915 [employer]. [address] [place] [address], [place] [address]. All rights reserved. This information is not intended as a substitute for professional medical care. Always follow your healthcare professional's instructions.         [id number]

## 2024-10-28 NOTE — OB PROVIDER H&P - HISTORY OF PRESENT ILLNESS
INCOMPLETE NOTE     HPI: Pt is a 34y   @ 37w5d  presenting for IOL for proteinuria -> met criteria for sPEC on admission.  Fetal movement (+)  Leakage of fluid (-)  Contractions (-)  Vaginal bleeding (-)  GBS pos  Estimated fetal weight: 2720    Prenatal care complicated by:  1. proteinuria -> sPEC on admission by severe range blood pressures   2. anemia -> s/p iron infusions x4, last in , now taking PO iron   3. AMA     OBHx:  NSCD 2018 FT 5#X, IOL for gHTN   GynHx: Denies any gynecologic issues  PMHx: Anemia   PSHx: Bariatric surgery ()  Med: PNV, bASA, Fe  All: NKDA  Psych: Denies hx of mental health issues  SH: Denies hx of smoking, drinking, or drug usage during the pregnancy       HPI: Pt is a 34y   @ 37w5d  presenting for IOL for gHTN-> met criteria for sPEC on admission.  Fetal movement (+)  Leakage of fluid (-)  Contractions (-)  Vaginal bleeding (-)  GBS pos  Estimated fetal weight: 2950    Prenatal care complicated by:  1. gHTN-> sPEC on admission by severe range blood pressures   2. anemia -> s/p iron infusions x4, last in , now taking PO iron   3. AMA     OBHx:  NSCD 2018 FT 5#X, IOL for gHTN   GynHx: 7cm fibroid subserosal, 4cm fibroid R lateral intramural   PMHx: Anemia   PSHx: Bariatric surgery ()  Med: PNV, bASA, Fe  All: NKDA  Psych: Denies hx of mental health issues  SH: Denies hx of smoking, drinking, or drug usage during the pregnancy       HPI: Pt is a 34y   @ 37w5d  presenting for IOL for gHTN-> met criteria for sPEC on admission.  Fetal movement (+)  Leakage of fluid (-)  Contractions (-)  Vaginal bleeding (-)  GBS pos  Estimated fetal weight: 2950    Prenatal care complicated by:  1. gHTN-> sPEC on admission by severe range blood pressures   2. anemia -> s/p iron infusions x4, last in , now taking PO iron   3. AMA     OBHx:  NSCD 2018 FT a little over 5#, IOL for gHTN   GynHx: 7cm fibroid subserosal, 4cm fibroid R lateral intramural   PMHx: Anemia   PSHx: Bariatric surgery ()  Med: PNV, bASA, Fe  All: NKDA  Psych: Denies hx of mental health issues  SH: Denies hx of smoking, drinking, or drug usage during the pregnancy

## 2024-10-28 NOTE — OB RN PATIENT PROFILE - NS_PRENATALLABSOURCEHEPATITISC_OBGYN_ALL_OB
Cartilage Graft Text: The defect edges were debeveled with a #15 scalpel blade.  Given the location of the defect, shape of the defect, the fact the defect involved a full thickness cartilage defect a cartilage graft was deemed most appropriate.  An appropriate donor site was identified, cleansed, and anesthetized. The cartilage graft was then harvested and transferred to the recipient site, oriented appropriately and then sutured into place.  The secondary defect was then repaired using a primary closure. hard copy, drawn during this pregnancy

## 2024-10-28 NOTE — PROVIDER CONTACT NOTE (OTHER) - ASSESSMENT
Patient alert and oriented. Denies dizziness, headache and or blurry vision. Denies RUQ pain as well.   Delay in notifying provider due to Patient repositioning in bed and FHR tracing discontinuous - RN at bedside adjusting transducer

## 2024-10-28 NOTE — OB PROVIDER H&P - NSLOWPPHRISK_OBGYN_A_OB
No previous uterine incision/[place] Pregnancy/Less than or equal to 4 previous vaginal births/No known bleeding disorder/No history of postpartum hemorrhage/No other PPH risks indicated

## 2024-10-28 NOTE — OB RN PATIENT PROFILE - TERM DELIVERIES, OB PROFILE
virus up to 7 days after symptoms start. Babies and people with weak immune systems can be contagious longer Researchers are still learning about this. It’s possible for a person to spread the virus about 2 days before symptoms start. They can spread the virus up to 10 days after symptoms start. People with no symptoms (asymptomatic) or who had symptoms that have gone away can still spread the virus for at least 10 days after testing positive for Covid-19 with a viral test.   Testing There are different kinds of rapid flu tests. The tests are done by wiping a swab inside your nose or throat. The results can come back in 10 minutes to several hours. A saliva-based test is being developed.  In some areas, a single test from the [facility] for both flu and SARS CoV-2 may be available. This test is used to help public health experts track infection rates. It won’t replace separate flu and COVID-19 tests. There are different kinds of tests for Covid-19. Some check for active infection. Others check for antibodies in the blood that are a sign of a past Covid-19 infection.  In some areas, a single test from the [facility] for both flu and SARS CoV-2 may be available. This test is used to help public health experts track infection rates. It won’t replace separate flu and Covid-19 tests.   Treatment The flu may be treated with antiviral medicine. This can help ease symptoms. It can also shorten the amount of time you’re sick. These medicines need to be taken as soon as possible when you start to feel sick. Antibiotics are not used because they don’t work on the flu virus. But antibiotics may be used to prevent or treat an infection by bacteria that can sometimes happen after having the flu. Other treatment for the flu includes care to ease symptoms. This includes rest, drinking plenty of fluids, and pain and fever medicine as needed. In severe cases, you may need time in the hospital to treat complications from flu. The FDA has approved  an antiviral medicine called remdesivir for people in the hospital. It is for people 12 years and older who weigh more than about 88 pounds (40 kgs). Remdesivir is approved only for people who need to be treated in the hospital. In certain cases, it may also be used for people younger than 12 years or who weigh less than 88 pounds (40 kgs).  Antibiotics are not used because they don’t work on the virus that causes Covid-19. But antibiotics may be used to prevent or treat an infection by bacteria that may happen after getting Covid-19.   Possible complications Can include:  · Bacterial infections  · Ear infection  · Inflammation of muscle tissues (myositis or rhabdomyolysis)  · Inflammation of the brain (encephalitis)  · Inflammation of the heart (myocarditis)  · Multiple-organ failure  · Pneumonia  · Sepsis  · Sinus infection  · Worsening of chronic conditions of the lungs, heart, and nervous system  · Worsening of diabetes Can include:  · Acute respiratory distress syndrome (ARDS)  · Bacterial infections  · Heart attack  · Inflammation of muscle tissues (myositis or rhabdomyolysis)  · Inflammation of the brain (encephalitis)  · Inflammation of the heart (myocarditis)  · Multiple-organ failure  · Pneumonia  · Respiratory failure  · Sepsis  · Stroke  · Worsening of chronic conditions of the lungs, heart, and nervous system  · Worsening of diabetes  · Multisystem inflammatory syndrome in children (MIS-C), a rare complication that causes inflammation of blood vessels and organs      Why getting a flu vaccine is important now  A flu vaccine doesn’t protect you from Covid-19. But it can reduce your risk of serious illness or death from the flu. The flu vaccine may help keep you out of the hospital. This is extra important while the Covid-19 pandemic is using a lot of medical resources.  Date last modified: 10/25/2021  Roel last reviewed this educational content on 8/1/2020    © 2541-2132 [employer]  All rights reserved. This information is not intended as a substitute for professional medical care. Always follow your healthcare professional's instructions.             FYI: Coronavirus Disease 2019 (COVID-19): Overview  Coronavirus disease 2019 (COVID-19) is an illness that infects the lungs. It's caused by a type of coronavirus called SARS-CoV-2. There are many types of coronaviruses. They are a very common cause of colds and bronchitis. They can cause a lung infection called pneumonia. Symptoms can range from mild to severe. Some people have no symptoms. These viruses are also found in some animals.   The virus that causes COVID-19 changes (mutates) all the time. This is what all viruses do. It leads to different versions of a virus. These are called variants. COVID-19 variants may spread more easily from person to person. They may cause milder symptoms. Or they may cause more severe symptoms.    The virus spreads and infects people easily. It can infect a person more easily if they are not immune to it. The virus most often spreads through droplets of fluid that a person coughs or sneezes into the air. It may be spread to you if you touch a surface with the virus on it and then touch your eyes, nose, or mouth.      To help prevent spreading the infection, wash your hands often, or use an alcohol-based hand .     For the latest information from the CDC:    · Go to the CDC website  · Call 239-DVH-VYLU (384-020-2878)    What are the symptoms of COVID-19?  Some people have no symptoms. Some have mild symptoms. And other people may have severe symptoms. Types of symptoms can vary from person to person. They may appear 2 to 14 days after contact with the virus. They can include:   · Fever  · Chills  · Coughing  · Trouble breathing or feeling short of breath  · Sore throat  · Stuffy or runny nose  · Headache  · Body aches  · Tiredness  · Nausea, vomiting, diarrhea, or abdominal pain  · New loss of sense of  smell or taste  Check your symptoms with the CDC’s Coronavirus Self-.   What are possible complications of COVID-19?  The virus can cause an infection in the lungs. This is called pneumonia. In some cases, this can lead to death. Experts are still learning more about COVID-19 complications. Many other complications are possible. They include:   · Low blood pressure  · Kidney failure  · Inflammation of the brain or heart  · Rashes  Some people are at higher risk for complications. This includes:   · Older adults  · People with heart or lung disease  · People with diabetes or kidney disease  · People with health conditions that suppress the immune system  · People who take medicines that suppress the immune system  Rarely, a child may have a severe complication. This is called multisystem inflammatory syndrome in children (MIS-C). MIS-C seems to be like Kawasaki disease. This is a rare illness. It causes inflammation of blood vessels and body organs. MIS can also happen in adults. But this is less common.     How is COVID-19 diagnosed?  Your healthcare provider will ask:  · What symptoms you have  · Where you live  · If you’ve traveled recently  · If you’ve had contact with sick people  · If you are vaccinated against COVID-19  · If you have had COVID-19   You may have 1 of these tests for COVID-19:  · Viral (molecular) test. You may also hear this called a PCR or RT-PCR test. Viral tests are very accurate. A viral test looks for the genetic material (RNA) of the SARS-CoV-2 virus. There are a few ways to do this. A swab may be wiped inside your nose or throat. Or a long swab may be put into your nose down to the back of your throat. Or a sample of your saliva may be taken. Your test results may be back in 45 minutes to a few hours. This depends on the type of test. Some tests must be sent to a lab. These can take several days for the results. Test kits you can use at home are now available. Some of these need a  prescription. If you use a home kit, follow the instructions in the kit closely. Some kits show results quickly at home. Others must be sent to a lab for the results.  · Antigen test. This can find proteins from the SARS-CoV-2 virus. A swab may be wiped inside your nose or throat. Or a long swab may be put into your nose down to the back of your throat. Some results are back within 15 to 60 minutes. This depends on the type of test. Positive results are very accurate. But false positive results can happen. And the results can be negative even in people with COVID-19. This is more common in places where not many people have the virus. Antigen tests are more likely to miss a COVID-19 infection than a viral (molecular) test. You may need to have a viral test if your antigen test is negative but you have symptoms of COVID-19.  If your provider thinks or confirms that you have COVID-19, you may have other tests. These tests may include:   · Antibody blood test. This type of test can show if you had the virus in the past. It shows antibodies for the virus in the blood. The accuracy of these tests varies. And they are not available everywhere. An antibody test may not show if you have an infection right now. This is because it can take up to a few weeks for your body to make antibodies. None of the antibody tests can yet be used to tell if a person is immune to the virus.  · Sputum culture. If you have a wet cough, you may be asked to cough up a bit of mucus (sputum) from your lungs. This is tested for the virus. It may be tested for pneumonia.  · Imaging tests. You may have a chest X-ray or CT scan.  Can you get COVID-19 again?  Yes, you can get COVID-19 more than once. You may have not gotten immunity. You could have lost the immunity. Or you may get COVID-19 from a different strain (variant) of the virus that you are not immune to. But the COVID-19 vaccine helps people who had COVID-19 lower their risk of having the  illness again.   Vaccines for COVID-19  The FDA and CDC advise vaccines to help prevent COVID-19. The vaccines can also make the illness less severe. It can keep you from needing to go to the hospital.  And it can prevent the spread of the virus to other people. No vaccine is 100% effective at preventing an illness. But getting a vaccine is important. The Pfizer vaccine is available for people as young as age 5. Pregnant or breastfeeding people can have the vaccine. Ask your healthcare provider which vaccine may be best for you.   The vaccines are given as a shot (injection). This is most often done in a muscle in the upper arm. There is a 1-dose vaccine. This is from [employer]. There are 2-dose vaccines. These are from Pfizer and Moderna. For a 2-dose vaccine, the second dose is given several weeks after the first. Some people may need a third dose of Pfizer or Moderna.   Who needs a third dose of Pfizer or Moderna?  A third dose of the Pfizer or Moderna vaccine may be needed for some people. This is for people who have a very weak immune system. The third dose is part of their first (primary) series. It's not a booster. This can happen if you had a solid organ transplant. It can be caused by other conditions or treatments. This third dose is to help a person with a weak immune system build up better protection against the virus. It's given at least 28 days after the second dose. Talk with your healthcare provider about your health risks to see if you need a third dose as part of your primary series.   COVID-19 vaccine booster shots  Everyone age 18 or older can get a COVID-19 booster shot. Here are your options.   Pfizer or Moderna booster  A booster shot of the Pfizer or Moderna vaccines is advised for many people. The booster shot is to be given at least 6 months after your primary series. Talk with your healthcare provider about your situation and risk. The CDC says these people should get a Pfizer or  home.  · Remdesivir. This is an antiviral medicine. The FDA has approved it for use on COVID-19. It works by stopping the spread of the SARS-CoV-2 virus in the body. It's approved only for people who are in the hospital. It's for people 12 years and older who weigh at least 88 pounds (40 kgs). In some cases, it may also be used for people younger than 12 years or who weigh less than 88 pounds (40 kgs).  · Steroids or other anti-inflammatory medicines.  These are used to lessen the intense inflammation that some people with COVID-19 can have. The inflammation can lead to more trouble breathing. It can cause other complications or death.  · COVID-19 convalescent plasma. Plasma is the liquid part of blood. People who had COVID-19 may be asked to donate plasma. This is called COVID-19 convalescent plasma. The plasma may have antibodies. These can help fight COVID-19 in people who are very ill with it. The FDA has approved it for emergency use in some people with severe but early COVID-19. Ask your provider if you qualify to donate.  · Monoclonal antibody therapy. The FDA approved this for emergency use in some people. They must have a positive COVID-19 viral test. They must have mild to moderate symptoms. They can’t be in the hospital. It's approved for people 12 years and older. They must weigh at least 88 pounds (40 kgs). And they must be at high risk for severe COVID-19 and a hospital stay. This includes people who are 65 years and older. And it includes people with some chronic conditions. This therapy is not approved for people who are in the hospital with COVID-19 or who need oxygen. Your healthcare team will tell you if you qualify.  Are you at risk for COVID-19?  You are at risk for COVID-19 if any of these apply to you:  · You live in an area with cases of COVID-19  · You traveled to an area with cases of COVID-19  · You had close contact with someone who had COVID-19  Close contact means being within 6 feet.    1

## 2024-10-29 DIAGNOSIS — O16.9 UNSPECIFIED MATERNAL HYPERTENSION, UNSPECIFIED TRIMESTER: ICD-10-CM

## 2024-10-29 PROBLEM — D64.9 ANEMIA, UNSPECIFIED: Chronic | Status: ACTIVE | Noted: 2024-10-17

## 2024-10-29 LAB
MAGNESIUM SERPL-MCNC: 3.7 MG/DL — HIGH (ref 1.6–2.6)
MAGNESIUM SERPL-MCNC: 4.3 MG/DL — HIGH (ref 1.6–2.6)
MAGNESIUM SERPL-MCNC: 4.8 MG/DL — HIGH (ref 1.6–2.6)
MAGNESIUM SERPL-MCNC: 4.9 MG/DL — HIGH (ref 1.6–2.6)
T PALLIDUM AB TITR SER: NEGATIVE — SIGNIFICANT CHANGE UP

## 2024-10-29 RX ORDER — NIFEDIPINE 90 MG
10 TABLET, EXTENDED RELEASE 24 HR ORAL ONCE
Refills: 0 | Status: COMPLETED | OUTPATIENT
Start: 2024-10-29 | End: 2024-10-29

## 2024-10-29 RX ORDER — OXYTOCIN IN D5W-0.2% SODIUM CL 15/250 ML
2 PLASTIC BAG, INJECTION (ML) INTRAVENOUS
Qty: 30 | Refills: 0 | Status: DISCONTINUED | OUTPATIENT
Start: 2024-10-29 | End: 2024-10-30

## 2024-10-29 RX ORDER — NIFEDIPINE 90 MG
20 TABLET, EXTENDED RELEASE 24 HR ORAL ONCE
Refills: 0 | Status: COMPLETED | OUTPATIENT
Start: 2024-10-29 | End: 2024-10-29

## 2024-10-29 RX ORDER — LABETALOL HCL 200 MG
20 TABLET ORAL ONCE
Refills: 0 | Status: COMPLETED | OUTPATIENT
Start: 2024-10-29 | End: 2024-10-29

## 2024-10-29 RX ORDER — ACETAMINOPHEN 500 MG
1000 TABLET ORAL ONCE
Refills: 0 | Status: COMPLETED | OUTPATIENT
Start: 2024-10-29 | End: 2024-10-29

## 2024-10-29 RX ADMIN — Medication 400 MILLIGRAM(S): at 10:21

## 2024-10-29 RX ADMIN — AMPICILLIN 108 GRAM(S): 2 INJECTION, POWDER, FOR SOLUTION INTRAVENOUS at 03:02

## 2024-10-29 RX ADMIN — Medication 10 MILLIGRAM(S): at 01:38

## 2024-10-29 RX ADMIN — AMPICILLIN 108 GRAM(S): 2 INJECTION, POWDER, FOR SOLUTION INTRAVENOUS at 11:25

## 2024-10-29 RX ADMIN — Medication 1000 MILLIGRAM(S): at 10:36

## 2024-10-29 RX ADMIN — AMPICILLIN 108 GRAM(S): 2 INJECTION, POWDER, FOR SOLUTION INTRAVENOUS at 15:25

## 2024-10-29 RX ADMIN — Medication 30 MILLIGRAM(S): at 18:08

## 2024-10-29 RX ADMIN — AMPICILLIN 108 GRAM(S): 2 INJECTION, POWDER, FOR SOLUTION INTRAVENOUS at 20:20

## 2024-10-29 RX ADMIN — Medication 50 GM/HR: at 07:11

## 2024-10-29 RX ADMIN — Medication 50 GM/HR: at 19:13

## 2024-10-29 RX ADMIN — AMPICILLIN 108 GRAM(S): 2 INJECTION, POWDER, FOR SOLUTION INTRAVENOUS at 07:03

## 2024-10-29 RX ADMIN — Medication 20 MILLIGRAM(S): at 19:17

## 2024-10-29 RX ADMIN — Medication 20 MILLIGRAM(S): at 02:09

## 2024-10-29 RX ADMIN — Medication 2 MILLIUNIT(S)/MIN: at 20:55

## 2024-10-29 NOTE — PROVIDER CONTACT NOTE (OTHER) - BACKGROUND
Patient admitted for IOL for proteinuria complicated by severe range blood pressures - meeting criteria for Preeclampsia with severe features Patient admitted for IOL for proteinuria complicated by severe range blood pressures - meeting criteria for Preeclampsia with severe features. Currently on Magnesium Sulfate ; see eMAR for meds

## 2024-10-30 ENCOUNTER — TRANSCRIPTION ENCOUNTER (OUTPATIENT)
Age: 34
End: 2024-10-30

## 2024-10-30 LAB
ALBUMIN SERPL ELPH-MCNC: 3.2 G/DL — LOW (ref 3.3–5)
ALP SERPL-CCNC: 110 U/L — SIGNIFICANT CHANGE UP (ref 40–120)
ALT FLD-CCNC: 9 U/L — SIGNIFICANT CHANGE UP (ref 4–33)
ANION GAP SERPL CALC-SCNC: 13 MMOL/L — SIGNIFICANT CHANGE UP (ref 7–14)
AST SERPL-CCNC: 23 U/L — SIGNIFICANT CHANGE UP (ref 4–32)
BASOPHILS # BLD AUTO: 0.03 K/UL — SIGNIFICANT CHANGE UP (ref 0–0.2)
BASOPHILS NFR BLD AUTO: 0.5 % — SIGNIFICANT CHANGE UP (ref 0–2)
BILIRUB SERPL-MCNC: 0.4 MG/DL — SIGNIFICANT CHANGE UP (ref 0.2–1.2)
BUN SERPL-MCNC: 4 MG/DL — LOW (ref 7–23)
CALCIUM SERPL-MCNC: 7.7 MG/DL — LOW (ref 8.4–10.5)
CHLORIDE SERPL-SCNC: 102 MMOL/L — SIGNIFICANT CHANGE UP (ref 98–107)
CO2 SERPL-SCNC: 19 MMOL/L — LOW (ref 22–31)
CREAT SERPL-MCNC: 0.7 MG/DL — SIGNIFICANT CHANGE UP (ref 0.5–1.3)
EGFR: 115 ML/MIN/1.73M2 — SIGNIFICANT CHANGE UP
EOSINOPHIL # BLD AUTO: 0.04 K/UL — SIGNIFICANT CHANGE UP (ref 0–0.5)
EOSINOPHIL NFR BLD AUTO: 0.6 % — SIGNIFICANT CHANGE UP (ref 0–6)
GLUCOSE SERPL-MCNC: 81 MG/DL — SIGNIFICANT CHANGE UP (ref 70–99)
HCT VFR BLD CALC: 32.7 % — LOW (ref 34.5–45)
HGB BLD-MCNC: 10.1 G/DL — LOW (ref 11.5–15.5)
IANC: 4.44 K/UL — SIGNIFICANT CHANGE UP (ref 1.8–7.4)
IMM GRANULOCYTES NFR BLD AUTO: 0.2 % — SIGNIFICANT CHANGE UP (ref 0–0.9)
KLEIHAUER-BETKE CALCULATION: 0 % — SIGNIFICANT CHANGE UP (ref 0–0.2)
LDH SERPL L TO P-CCNC: 267 U/L — HIGH (ref 135–225)
LYMPHOCYTES # BLD AUTO: 1.41 K/UL — SIGNIFICANT CHANGE UP (ref 1–3.3)
LYMPHOCYTES # BLD AUTO: 21.9 % — SIGNIFICANT CHANGE UP (ref 13–44)
MAGNESIUM SERPL-MCNC: 4.6 MG/DL — HIGH (ref 1.6–2.6)
MAGNESIUM SERPL-MCNC: 4.7 MG/DL — HIGH (ref 1.6–2.6)
MAGNESIUM SERPL-MCNC: 4.8 MG/DL — HIGH (ref 1.6–2.6)
MAGNESIUM SERPL-MCNC: 4.8 MG/DL — HIGH (ref 1.6–2.6)
MCHC RBC-ENTMCNC: 26.4 PG — LOW (ref 27–34)
MCHC RBC-ENTMCNC: 30.9 G/DL — LOW (ref 32–36)
MCV RBC AUTO: 85.6 FL — SIGNIFICANT CHANGE UP (ref 80–100)
MONOCYTES # BLD AUTO: 0.5 K/UL — SIGNIFICANT CHANGE UP (ref 0–0.9)
MONOCYTES NFR BLD AUTO: 7.8 % — SIGNIFICANT CHANGE UP (ref 2–14)
NEUTROPHILS # BLD AUTO: 4.44 K/UL — SIGNIFICANT CHANGE UP (ref 1.8–7.4)
NEUTROPHILS NFR BLD AUTO: 69 % — SIGNIFICANT CHANGE UP (ref 43–77)
NRBC # BLD: 0 /100 WBCS — SIGNIFICANT CHANGE UP (ref 0–0)
NRBC # FLD: 0 K/UL — SIGNIFICANT CHANGE UP (ref 0–0)
PLATELET # BLD AUTO: 304 K/UL — SIGNIFICANT CHANGE UP (ref 150–400)
POTASSIUM SERPL-MCNC: 3.6 MMOL/L — SIGNIFICANT CHANGE UP (ref 3.5–5.3)
POTASSIUM SERPL-SCNC: 3.6 MMOL/L — SIGNIFICANT CHANGE UP (ref 3.5–5.3)
PROT SERPL-MCNC: 6.3 G/DL — SIGNIFICANT CHANGE UP (ref 6–8.3)
RBC # BLD: 3.82 M/UL — SIGNIFICANT CHANGE UP (ref 3.8–5.2)
RBC # FLD: 15.4 % — HIGH (ref 10.3–14.5)
SODIUM SERPL-SCNC: 134 MMOL/L — LOW (ref 135–145)
URATE SERPL-MCNC: 4.4 MG/DL — SIGNIFICANT CHANGE UP (ref 2.5–7)
WBC # BLD: 6.43 K/UL — SIGNIFICANT CHANGE UP (ref 3.8–10.5)
WBC # FLD AUTO: 6.43 K/UL — SIGNIFICANT CHANGE UP (ref 3.8–10.5)

## 2024-10-30 PROCEDURE — 88307 TISSUE EXAM BY PATHOLOGIST: CPT | Mod: 26

## 2024-10-30 RX ORDER — PRENATAL VIT/IRON FUM/FOLIC AC 60 MG-1 MG
1 TABLET ORAL DAILY
Refills: 0 | Status: DISCONTINUED | OUTPATIENT
Start: 2024-10-30 | End: 2024-11-01

## 2024-10-30 RX ORDER — MODIFIED LANOLIN
1 OINTMENT (GRAM) TOPICAL EVERY 6 HOURS
Refills: 0 | Status: DISCONTINUED | OUTPATIENT
Start: 2024-10-30 | End: 2024-11-01

## 2024-10-30 RX ORDER — NIFEDIPINE 90 MG
60 TABLET, EXTENDED RELEASE 24 HR ORAL DAILY
Refills: 0 | Status: DISCONTINUED | OUTPATIENT
Start: 2024-10-30 | End: 2024-10-30

## 2024-10-30 RX ORDER — NIFEDIPINE 90 MG
1 TABLET, EXTENDED RELEASE 24 HR ORAL
Qty: 0 | Refills: 0 | DISCHARGE
Start: 2024-10-30

## 2024-10-30 RX ORDER — PRAMOXINE HCL 1 %
1 CREAM (GRAM) RECTAL EVERY 4 HOURS
Refills: 0 | Status: DISCONTINUED | OUTPATIENT
Start: 2024-10-30 | End: 2024-11-01

## 2024-10-30 RX ORDER — LABETALOL HCL 200 MG
200 TABLET ORAL THREE TIMES A DAY
Refills: 0 | Status: DISCONTINUED | OUTPATIENT
Start: 2024-10-30 | End: 2024-11-01

## 2024-10-30 RX ORDER — NIFEDIPINE 90 MG
10 TABLET, EXTENDED RELEASE 24 HR ORAL ONCE
Refills: 0 | Status: DISCONTINUED | OUTPATIENT
Start: 2024-10-30 | End: 2024-10-30

## 2024-10-30 RX ORDER — DIBUCAINE 1 %
1 OINTMENT (GRAM) TOPICAL EVERY 6 HOURS
Refills: 0 | Status: DISCONTINUED | OUTPATIENT
Start: 2024-10-30 | End: 2024-11-01

## 2024-10-30 RX ORDER — IBUPROFEN 200 MG
600 TABLET ORAL EVERY 6 HOURS
Refills: 0 | Status: COMPLETED | OUTPATIENT
Start: 2024-10-30 | End: 2025-09-28

## 2024-10-30 RX ORDER — SIMETHICONE 80 MG/1
80 TABLET, CHEWABLE ORAL EVERY 4 HOURS
Refills: 0 | Status: DISCONTINUED | OUTPATIENT
Start: 2024-10-30 | End: 2024-11-01

## 2024-10-30 RX ORDER — MAGNESIUM SULFATE IN 0.9% NACL 2 G/50 ML
2 INTRAVENOUS SOLUTION, PIGGYBACK (ML) INTRAVENOUS
Qty: 40 | Refills: 0 | Status: DISCONTINUED | OUTPATIENT
Start: 2024-10-30 | End: 2024-10-30

## 2024-10-30 RX ORDER — MAGNESIUM SULFATE IN 0.9% NACL 2 G/50 ML
4 INTRAVENOUS SOLUTION, PIGGYBACK (ML) INTRAVENOUS ONCE
Refills: 0 | Status: DISCONTINUED | OUTPATIENT
Start: 2024-10-30 | End: 2024-10-30

## 2024-10-30 RX ORDER — OXYTOCIN IN D5W-0.2% SODIUM CL 15/250 ML
167 PLASTIC BAG, INJECTION (ML) INTRAVENOUS
Qty: 30 | Refills: 0 | Status: DISCONTINUED | OUTPATIENT
Start: 2024-10-30 | End: 2024-10-31

## 2024-10-30 RX ORDER — NIFEDIPINE 90 MG
60 TABLET, EXTENDED RELEASE 24 HR ORAL DAILY
Refills: 0 | Status: DISCONTINUED | OUTPATIENT
Start: 2024-10-30 | End: 2024-11-01

## 2024-10-30 RX ORDER — CLOSTRIDIUM TETANI TOXOID ANTIGEN (FORMALDEHYDE INACTIVATED), CORYNEBACTERIUM DIPHTHERIAE TOXOID ANTIGEN (FORMALDEHYDE INACTIVATED), BORDETELLA PERTUSSIS TOXOID ANTIGEN (GLUTARALDEHYDE INACTIVATED), BORDETELLA PERTUSSIS FILAMENTOUS HEMAGGLUTININ ANTIGEN (FORMALDEHYDE INACTIVATED), BORDETELLA PERTUSSIS PERTACTIN ANTIGEN, AND BORDETELLA PERTUSSIS FIMBRIAE 2/3 ANTIGEN 5; 2; 2.5; 5; 3; 5 [LF]/.5ML; [LF]/.5ML; UG/.5ML; UG/.5ML; UG/.5ML; UG/.5ML
0.5 INJECTION, SUSPENSION INTRAMUSCULAR ONCE
Refills: 0 | Status: DISCONTINUED | OUTPATIENT
Start: 2024-10-30 | End: 2024-11-01

## 2024-10-30 RX ORDER — HYDROCORTISONE 1 %
1 OINTMENT (GRAM) TOPICAL EVERY 6 HOURS
Refills: 0 | Status: DISCONTINUED | OUTPATIENT
Start: 2024-10-30 | End: 2024-11-01

## 2024-10-30 RX ORDER — OXYCODONE HYDROCHLORIDE 30 MG/1
5 TABLET ORAL
Refills: 0 | Status: DISCONTINUED | OUTPATIENT
Start: 2024-10-30 | End: 2024-11-01

## 2024-10-30 RX ORDER — LABETALOL HCL 200 MG
20 TABLET ORAL ONCE
Refills: 0 | Status: COMPLETED | OUTPATIENT
Start: 2024-10-30 | End: 2024-10-30

## 2024-10-30 RX ORDER — SODIUM CHLORIDE 9 MG/ML
3 INJECTION, SOLUTION INTRAMUSCULAR; INTRAVENOUS; SUBCUTANEOUS EVERY 8 HOURS
Refills: 0 | Status: DISCONTINUED | OUTPATIENT
Start: 2024-10-30 | End: 2024-11-01

## 2024-10-30 RX ORDER — MAGNESIUM SULFATE IN 0.9% NACL 2 G/50 ML
2 INTRAVENOUS SOLUTION, PIGGYBACK (ML) INTRAVENOUS
Qty: 40 | Refills: 0 | Status: DISCONTINUED | OUTPATIENT
Start: 2024-10-30 | End: 2024-10-31

## 2024-10-30 RX ORDER — ACETAMINOPHEN 500 MG
975 TABLET ORAL
Refills: 0 | Status: DISCONTINUED | OUTPATIENT
Start: 2024-10-30 | End: 2024-11-01

## 2024-10-30 RX ORDER — OXYCODONE HYDROCHLORIDE 30 MG/1
5 TABLET ORAL ONCE
Refills: 0 | Status: DISCONTINUED | OUTPATIENT
Start: 2024-10-30 | End: 2024-11-01

## 2024-10-30 RX ORDER — KETOROLAC TROMETHAMINE 30 MG/ML
30 INJECTION INTRAMUSCULAR; INTRAVENOUS ONCE
Refills: 0 | Status: DISCONTINUED | OUTPATIENT
Start: 2024-10-30 | End: 2024-10-31

## 2024-10-30 RX ORDER — BENZOCAINE 200 MG/G
1 GEL ORAL EVERY 6 HOURS
Refills: 0 | Status: DISCONTINUED | OUTPATIENT
Start: 2024-10-30 | End: 2024-11-01

## 2024-10-30 RX ORDER — NIFEDIPINE 90 MG
20 TABLET, EXTENDED RELEASE 24 HR ORAL ONCE
Refills: 0 | Status: COMPLETED | OUTPATIENT
Start: 2024-10-30 | End: 2024-10-30

## 2024-10-30 RX ORDER — DIPHENHYDRAMINE HCL 12.5MG/5ML
25 ELIXIR ORAL EVERY 6 HOURS
Refills: 0 | Status: DISCONTINUED | OUTPATIENT
Start: 2024-10-30 | End: 2024-11-01

## 2024-10-30 RX ORDER — MAGNESIUM HYDROXIDE 1200 MG/15ML
30 SUSPENSION ORAL
Refills: 0 | Status: DISCONTINUED | OUTPATIENT
Start: 2024-10-30 | End: 2024-11-01

## 2024-10-30 RX ADMIN — Medication 50 GM/HR: at 09:18

## 2024-10-30 RX ADMIN — Medication 20 MILLIGRAM(S): at 06:21

## 2024-10-30 RX ADMIN — AMPICILLIN 108 GRAM(S): 2 INJECTION, POWDER, FOR SOLUTION INTRAVENOUS at 00:23

## 2024-10-30 RX ADMIN — Medication 60 MILLIGRAM(S): at 17:42

## 2024-10-30 RX ADMIN — Medication 50 GM/HR: at 19:15

## 2024-10-30 RX ADMIN — Medication 50 GM/HR: at 07:18

## 2024-10-30 RX ADMIN — Medication 167 MILLIUNIT(S)/MIN: at 04:36

## 2024-10-30 RX ADMIN — SODIUM CHLORIDE 3 MILLILITER(S): 9 INJECTION, SOLUTION INTRAMUSCULAR; INTRAVENOUS; SUBCUTANEOUS at 22:00

## 2024-10-30 RX ADMIN — Medication 50 MILLILITER(S): at 09:19

## 2024-10-30 RX ADMIN — Medication 200 MILLIGRAM(S): at 13:31

## 2024-10-30 RX ADMIN — Medication 200 MILLIGRAM(S): at 22:00

## 2024-10-30 RX ADMIN — Medication 20 MILLIGRAM(S): at 09:41

## 2024-10-30 RX ADMIN — AMPICILLIN 108 GRAM(S): 2 INJECTION, POWDER, FOR SOLUTION INTRAVENOUS at 04:10

## 2024-10-30 NOTE — OB NEONATOLOGY/PEDIATRICIAN DELIVERY SUMMARY - BABY A: APGAR 5 MIN COLOR, DELIVERY
(1) body pink, extremities blue Libtayo Counseling- I discussed with the patient the risks of Libtayo including but not limited to nausea, vomiting, diarrhea, and bone or muscle pain.  The patient verbalized understanding of the proper use and possible adverse effects of Libtayo.  All of the patient's questions and concerns were addressed.

## 2024-10-30 NOTE — OB NEONATOLOGY/PEDIATRICIAN DELIVERY SUMMARY - NS_GESTAGEATBIRTHA_OBGYN_ALL_OB_FT
Patient was replied to on previous encounter. This is a duplicate. [Chaperone Present] : A chaperone was present in the examining room during all aspects of the physical examination [Scar] : a scar was noted [Normal Appearance] : general appearance was normal [Aa ____] : Aa [unfilled] [Ba ____] : Ba [unfilled] [C ____] : C [unfilled] [GH ____] : GH [unfilled] [PB ____] : PB [unfilled] [TVL ____] : TVL  [unfilled] [Ap ____] : Ap [unfilled] [Bp ____] : Bp [unfilled] [D ____] : D [unfilled] [Absent] : absent 38w [Normal] : no abnormalities [Hernia] : no hernia observed [FreeTextEntry4] : No exposure graft nontender

## 2024-10-30 NOTE — DISCHARGE NOTE OB - MATERIALS PROVIDED
[facility] [place] Screening Program/[place]  Immunization Record/Breastfeeding Log/[facility] Hearing Screen Program/Shaken Baby Prevention Handout/Birth Certificate Instructions/Tdap Vaccination (VIS Pub Date: 2012)

## 2024-10-30 NOTE — DISCHARGE NOTE OB - FINANCIAL ASSISTANCE
[facility] provides services at a reduced cost to those who are determined to be eligible through [facility]’s financial assistance program. Information regarding [facility]’s financial assistance program can be found by going to [email or web address] or by calling [phone].

## 2024-10-30 NOTE — OB RN DELIVERY SUMMARY - NS_SEPSISRSKCALC_OBGYN_ALL_OB_FT
EOS calculated successfully. EOS Risk Factor: 0.5/1000 live births ([facility] national incidence); GA=38w;Temp=98.42; ROM=1.983; GBS='Positive'; Antibiotics='Broad spectrum antibiotics 2-3.9 hrs prior to birth'

## 2024-10-30 NOTE — OB PROVIDER LABOR PROGRESS NOTE - NS_SUBJECTIVE/OBJECTIVE_OBGYN_ALL_OB_FT
PGY4 Labor Note    Patient seen due to loss of contact of fetal heart tracing on external monitor. Pt switching positions due to pain and RN unable to relocate FH due to patient position and habitus     T(C): 36.6 (10-30-24 @ 00:00), Max: 36.9 (10-29-24 @ 16:00)  HR: 117 (10-30-24 @ 03:29) (70 - 129)  BP: 134/73 (10-30-24 @ 03:02) (119/72 - 182/105)  RR: 14 (10-30-24 @ 00:00) (14 - 18)  SpO2: 97% (10-30-24 @ 03:29) (89% - 100%)
S:  Pt seen and examined for cervical balloon placement.    O:  Vital Signs Last 24 Hrs  T(C): 36.8 (29 Oct 2024 14:00), Max: 36.8 (28 Oct 2024 17:28)  T(F): 98.24 (29 Oct 2024 14:00), Max: 98.24 (28 Oct 2024 18:45)  HR: 87 (29 Oct 2024 15:03) (69 - 129)  BP: 141/92 (29 Oct 2024 14:44) (122/64 - 193/92)  BP(mean): --  RR: 17 (29 Oct 2024 14:00) (14 - 18)  SpO2: 94% (29 Oct 2024 15:03) (88% - 100%)    Parameters below as of 28 Oct 2024 18:31  Patient On (Oxygen Delivery Method): room air
S:  Pt seen and examined for cervical balloon placement attempt.    O:  Vital Signs Last 24 Hrs  T(C): 36.5 (29 Oct 2024 05:30), Max: 36.8 (28 Oct 2024 17:28)  T(F): 97.7 (29 Oct 2024 05:30), Max: 98.24 (28 Oct 2024 18:45)  HR: 94 (29 Oct 2024 08:18) (69 - 129)  BP: 142/83 (29 Oct 2024 07:45) (122/64 - 193/92)  BP(mean): --  RR: 18 (29 Oct 2024 05:30) (14 - 18)  SpO2: 94% (29 Oct 2024 08:18) (88% - 100%)    Parameters below as of 28 Oct 2024 18:31  Patient On (Oxygen Delivery Method): room air
Patient examined 2/2 CB out
PGY4 Labor Note    Patient seen and evaluated at bedside.  Denies complaints.  Comfortable w/ epidural.      T(C): 36.6 (10-30-24 @ 00:00), Max: 36.9 (10-29-24 @ 16:00)  HR: 99 (10-30-24 @ 02:45) (70 - 129)  BP: 147/84 (10-30-24 @ 02:32) (122/64 - 182/105)  RR: 14 (10-30-24 @ 00:00) (14 - 18)  SpO2: 94% (10-30-24 @ 02:39) (89% - 100%)

## 2024-10-30 NOTE — DISCHARGE NOTE OB - MEDICATION SUMMARY - MEDICATIONS TO TAKE
I will START or STAY ON the medications listed below when I get home from the hospital:    NIFEdipine 30 mg oral tablet, extended release  -- 1 tab(s) by mouth once a day  -- Indication: For Maternal hypertension, antepartum   I will START or STAY ON the medications listed below when I get home from the hospital:    ibuprofen 600 mg oral tablet  -- 1 tab(s) by mouth every 6 hours as needed for  moderate pain  -- Indication: For pain    labetalol 200 mg oral tablet  -- 1 tab(s) by mouth 3 times a day  -- Indication: For Maternal hypertension, antepartum    NIFEdipine 30 mg oral tablet, extended release  -- 1 tab(s) by mouth once a day  -- Indication: For Maternal hypertension, antepartum

## 2024-10-30 NOTE — OB PROVIDER LABOR PROGRESS NOTE - ASSESSMENT
Pt is a 33yo  admitted for IOL for sPEC on Mg.    - Cervical balloon out  - Start pitocin at 8:45pm 2x2  - Continue cont EFM, toco, IVF  - GBS negative    D/w Dr. Mercer A Sacks, PGY1   
A/P: 34y P1 admitted for sPEC IOL   - pt now s/p AROM with clear fluid   - IUPC placed with clear flashback, no resistance   - c/w pitocin; uptitrate until adequate mV   - maternal repositioning  - epi with top off prn     Karen Ahmed PGY4
Pt is a 35yo  admitted for IOL for sPEC on Mg.    - Cervical balloon placed at 3:08p; patient tolerated, no complications  - Continue cont EFM, toco, IVF  - GBS negative    Patient seen with Dr. Crowder, PGY-4  Plan per Dr. Moreno, attending  Melissa Fernandez MD PGY1
ISE placed for fetal heart rate. FH noted to be down at that time - kendrick of 40s. Patient repositioned from right to left lateral and then to high fowlers. Pitocin paused. FH returned to baseline for ~1 min before deceling again with contraction. LR bolus started and terbutaline administered. Pt repositioned to hands/knees and FH recovered to baseline.   - will keep patient on all fours for 30 mins   - eval fetal heart tracing in 30 mins and restart pitocin if tracing remains reassuring   - c/w amnioinfusion - 500cc bolus + 125 maintenance. c/w chux checks      Dr. Dan and attending Dr. Peralta at bedside     Karen Cadet PGY4
Pt is a 33yo  admitted for IOL for sPEC on Magnesium and [id number].    - Cervical balloon placement attempted, but unsuccessful at this time. Will attempt after 1-2 more doses of buccal cytotec  - To administer next buccal cytotec now (BC#3)  - Continue cont EFM, toco, IVF  - GBS positive on Ampicillin    Plan per Dr. Moreno, attending  Melissa Fernandez MD PGY1

## 2024-10-30 NOTE — CHART NOTE - NSCHARTNOTEFT_GEN_A_CORE
patient seen at bedside due to recurrent decel   pt given terb x 1   placed on all fours   pitocin paused   tracing recovered to category 1   pitocin to be restarted in 30 minutes if tracing is reassuring

## 2024-10-30 NOTE — OB RN DELIVERY SUMMARY - NS_LABORMEDS_OBGYN_ALL_OB
Anesthesia Pre Eval Note    Anesthesia ROS/Med Hx    Overall Review:  Echo was reviewed     Anesthetic Complication History:  Patient does not have a history of anesthetic complications      Pulmonary Review:    Negative for recent URI   The patient is not a smoker.    Neuro/Psych Review:  Comments: glaucoma     Positive for CVA ([id number])    Cardiovascular Review:  Comments: Echo 7/21/22  Left ventricular ejection fraction; 50-55 %.  Normal left ventricular chamber size & wall thickness.  Diastolic dysfunction with increased filling pressure.  No gross left ventricular regional wall motion abnormalities.  Mildly increased right ventricular chamber size.  Normal right ventricular systolic function.  Moderate pulmonary hypertension; RVSP 52 mmHg.  Severely increased left atrial volume index of 57.5 ml/m².  Moderate to severe tricuspid valve regurgitation.  Moderate mitral valve regurgitation.  Trileaflet aortic valve with mild regurgitation.  Dilated IVC with decreased respiratory variation. RAP 15 mmHg.    Positive for CHF  Positive for pulmonary hypertension (RVSP 52 mmHg)  Positive for valvular problems/murmurs (mild) - murmur type AI and MR  Positive for dysrhythmias - Chronic A-fib  Positive for hypertension  Positive for hyperlipidemia    End/Other Review:    Positive for anemia - Chronic  Positive for thrombocytopenia (platelet count 133 on 3/23/23 )  Additional Results:     ALLERGIES:   -- Sotalol -- WEAKNESS        Physical Exam     Airway   Mallampati: II  TM Distance: >3 FB  Neck ROM: Full    Cardiovascular    Cardio Rhythm: Irregular  Cardio Rate: Normal    General Assessment  General Assessment: Alert and oriented and No acute distress    Dental Exam  Dental exam normal    Pulmonary Exam  Pulmonary exam normal      Anesthesia Plan:    ASA Status: 4  Anesthesia Type: MAC    Preferred Airway Type: Nasal Cannula    Postoperative analgesia plan does NOT include opiods    Checklist  Reviewed: Past Med History,  Allergies, Patient Summary, Medications, Problem list and NPO Status  Consent/Risks Discussed Statement:  The proposed anesthetic plan, including its risks and benefits, have been discussed with the Patient along with the risks and benefits of alternatives. Questions were encouraged and answered and the patient and/or representative understands and agrees to proceed.        I discussed with the patient (and/or patient's legal representative) the risks and benefits of the proposed anesthesia plan, MAC, which may include services performed by other anesthesia providers.    Alternative anesthesia plans, if available, were reviewed with the patient (and/or patient's legal representative). Discussion has been held with the patient (and/or patient's legal representative) regarding risks of anesthesia, which include Intra-operative Awareness and emergent situations that may require change in anesthesia plan.    The patient (and/or patient's legal representative) has indicated understanding, his/her questions have been answered, and he/she wishes to proceed with the planned anesthetic.    Comments  Plan Comments: Topical MAC     Antibiotics/Tocolytics/Uterotonics

## 2024-10-30 NOTE — DISCHARGE NOTE OB - PATIENT PORTAL LINK FT
You can access the FollowMyHealth Patient Portal offered by [facility] by registering at the following website: [email or web address]. By joining [employer]’s FollowMyHealth portal, you will also be able to view your health information using other applications (apps) compatible with our system.

## 2024-10-30 NOTE — OB PROVIDER LABOR PROGRESS NOTE - NS_OBIHIFHRDETAILS_OBGYN_ALL_OB_FT
130 bpm/mod diana/+ accels/- decels
baseline 130bpm, moderate variability, +accels, -decels
135/min-mod/-accels/+recurrent late decels
baseline 130bpm, minimal variability (pt on Magnesium sulfate), +accels, -decels
130/mod/-accels/+recurrent late decels

## 2024-10-30 NOTE — PROVIDER CONTACT NOTE (OTHER) - ACTION/TREATMENT ORDERED:
MD Hatch aware.  As per MD orders, give pt. stat dose of PO Procardia 20mg IR.  Rpt BP in 15 mins after administration. MD Hatch aware.  As per MD orders, give pt. stat dose of PO Procardia 20mg IR.  Rpt BP in 15 mins after med administration.

## 2024-10-30 NOTE — DISCHARGE NOTE OB - MEDICATION SUMMARY - MEDICATIONS TO STOP TAKING
I will STOP taking the medications listed below when I get home from the hospital:  None I will STOP taking the medications listed below when I get home from the hospital:    aspirin 162mg po  qd

## 2024-10-30 NOTE — CHART NOTE - NSCHARTNOTEFT_GEN_A_CORE
BP x 24 hours: BP:  (119/72 - 190/97)    T(C): 37.1 (10-30-24 @ 09:25), Max: 37.1 (10-30-24 @ 09:25)  T(F): 98.8 (10-30-24 @ 09:25), Max: 98.8 (10-30-24 @ 09:25)  HR: 89 (10-30-24 @ 09:25) (70 - 117)  BP: 158/101 (10-30-24 @ 09:25) (119/72 - 190/97)  RR: 18 (10-30-24 @ 09:25) (14 - 18)  SpO2: 99% (10-30-24 @ 09:25) (89% - 100%)  Wt(kg): --      LABS:  cret                        10.1   6.43  )-----------( 304      ( 30 Oct 2024 01:40 )             32.7     10-30    134[L]  |  102  |  4[L]  ----------------------------<  81  3.6   |  19[L]  |  0.70    Ca    7.7[L]      30 Oct 2024 01:40  Mg     4.80     10-30    TPro  6.3  /  Alb  3.2[L]  /  TBili  0.4  /  DBili  x   /  AST  23  /  ALT  9   /  AlkPhos  110  10-30    -----------------------------------------------------------------------------------------------------------------------------------------------------------------------      PLAN    1. Procardia IR 20mg ordered now    2. Procardia xl 30mg daily discontinued    3. Procardia xl 60mg ordered for next dose (to be given at 6pm)    4. Labetalol 200mg TID ordered, 6am dose was not given by RN, 2pm is the next scheduled dose     5. BP x 24 hours: BP:  (119/72 - 190/97)    T(C): 37.1 (10-30-24 @ 09:25), Max: 37.1 (10-30-24 @ 09:25)  T(F): 98.8 (10-30-24 @ 09:25), Max: 98.8 (10-30-24 @ 09:25)  HR: 89 (10-30-24 @ 09:25) (70 - 117)  BP: 158/101 (10-30-24 @ 09:25) (119/72 - 190/97)  RR: 18 (10-30-24 @ 09:25) (14 - 18)  SpO2: 99% (10-30-24 @ 09:25) (89% - 100%)  Wt(kg): --      LABS:  cret                        10.1   6.43  )-----------( 304      ( 30 Oct 2024 01:40 )             32.7     10-30    134[L]  |  102  |  4[L]  ----------------------------<  81  3.6   |  19[L]  |  0.70    Ca    7.7[L]      30 Oct 2024 01:40  Mg     4.80     10-30    TPro  6.3  /  Alb  3.2[L]  /  TBili  0.4  /  DBili  x   /  AST  23  /  ALT  9   /  AlkPhos  110  10-30    -----------------------------------------------------------------------------------------------------------------------------------------------------------------------      s/p  10/30/24 @ 0433am, c/b PEC w/ SF.       Patient was noted to have been given the following meds during this admission :    10/28/24 @ 1806pm--Procardia IR 10mg    10/29/24 @ 0138am--Procardia IR 10mg     10/29/24 @ 0209am--Procardia IR 20mg     10/29/24 @ 1917pm--Labetalol 20mg IV push    10/30/24 @ 0621am--Labetalol 20mg IV push    10/30/24 @ 1808pm--Procardia xl 30mg       LABETALOL 200MG TID WAS ORDERED TODAY, 6AM DOSE WAS NOT GIVEN      -------------------------------------------------------------------------------------------------------------------------------------------------------------------------      PLAN    1. Procardia IR 20mg ordered now    2. Procardia xl 30mg daily discontinued    3. Procardia xl 60mg ordered for next dose (to be given at 6pm)    4. Labetalol 200mg TID ordered, 6am dose was not given by RN, 2pm is the next scheduled dose     5. PENDING BP AFTER PROCARDIA IR 20MG GIVEN, TO TAKE FURTHER ACTION AS NEEDED--PP RN TO CONTACT DR. GARCIA DIRECTLY    6 CONTINUE TO MONITOR          Discussed with MD Holden Diaz

## 2024-10-30 NOTE — PROVIDER CONTACT NOTE (OTHER) - ASSESSMENT
Pt. w/ severe BP's on admission VS & orthostatic VS.  Orthostatic VS: Pt. w/ severe BP's on admission VS & orthostatic VS.  Orthostatic VS: Laying /101, HR 89.  Sitting /107, HR 85.  Standing /110, 93 HR.  Pt. denies any lightheadedness/dizziness, headache, right upper quadrant pain, or blurred vision.

## 2024-10-30 NOTE — DISCHARGE NOTE OB - CARE PROVIDER_API CALL
Regi Peralta  Obstetrics and Gynecology  [address] [place] [id number]  Phone: [phone]  Fax: [phone]  Established Patient  Follow Up Time: 1-3 days

## 2024-10-30 NOTE — PROVIDER CONTACT NOTE (OTHER) - BACKGROUND
LYN 10/30/2024 @ 0433.  Parity 2002.  QBL 186mL.  S/p Rectal Cytotec.  38 weeks gestation.  A- bloodtype.  Came in from office w/ proteinuria.  Triage throwing severe BPs, PEC w/ S.F.  Pt. on mag.

## 2024-10-30 NOTE — OB NEONATOLOGY/PEDIATRICIAN DELIVERY SUMMARY - NSPEDSNEONOTESA_OBGYN_ALL_OB_FT
Pediatrician called to delivery. Male SGA infant born at  38 wks via  to a 37 y/o  blood type A- mother. Maternal history of anemia. Prenatal history of SPEC, gHTN. Prenatal labs nr/immune/-, GBS + on 10/14.  SROM at 0234 on 10/30 with clear fluids. EOS score 0.05, highest maternal temperature 36.9. Baby emerged vigorous, crying.  Infant was brought to radiant warmer and warmed, dried, stimulated and suctioned. HR>100, normal respiratory effort. APGARS of 9/9 . Mom is initiating breast feeding. Defers Hepatitis B vaccination. Desires/Declines for infant to be circumcised. Admitted under Dr. Zabala.     BW: 2700  : 10/230/24  TOB: 04:33    Physical Exam:  Gen: no acute distress, +grimace  HEENT:  anterior fontanel open soft and flat, nondysmorphic facies, no cleft lip/palate, ears normal set, nares clinically patent  Resp: Normal respiratory effort without grunting or retractions, good air entry b/l, clear to auscultation bilaterally  Cardio: Present S1/S2, regular rate and rhythm, no murmurs  Abd: soft, non tender, non distended, umbilical cord with 3 vessels  Neuro: +palmar and plantar grasp, +suck, +rashaad, normal tone  Extremities: negative daniels and ortolani maneuvers, moving all extremities, no clavicular crepitus or stepoff  Skin: pink, warm  Genitals: Normal male anatomy, anus patent

## 2024-10-30 NOTE — OB RN DELIVERY SUMMARY - NSSELHIDDEN_OBGYN_ALL_OB_FT
[NS_DeliveryAttending1_OBGYN_ALL_OB_FT:[id number]],[NS_DeliveryRN_OBGYN_ALL_OB_FT:[id number]=]

## 2024-10-30 NOTE — DISCHARGE NOTE OB - CARE PLAN
1 Principal Discharge DX:	 (normal spontaneous vaginal delivery)   Principal Discharge DX:	 (normal spontaneous vaginal delivery)  Assessment and plan of treatment:	Regular Diet, Vaginal rest X 6 weeks

## 2024-10-30 NOTE — PROVIDER CONTACT NOTE (OTHER) - ACTION/TREATMENT ORDERED:
RN to repeat BP in 15min, if BP is not within severe range no action needed. If severe procardia 10mg IR to be administered and BP to be repeated.

## 2024-10-30 NOTE — OB RN DELIVERY SUMMARY - NS_LABORCHARACTER_OBGYN_ALL_OB
Induction of labor-AROM/Induction of labor-Medicinal/Augmentation of labor/Internal electronic FM/External electronic FM/Antibiotics in labor/Fetal intolerance

## 2024-10-30 NOTE — OB PROVIDER DELIVERY SUMMARY - NSPROVIDERDELIVERYNOTE_OBGYN_ALL_OB_FT
Delivery of male infant. Head, shoulders and body delivered without difficulty. loose nuchal x 2 reduced. infant handed to mother. apgars 9/9 5lbs 15oz. cord gases obtained. placenta delivered intact. no lacerations noted

## 2024-10-31 LAB — MAGNESIUM SERPL-MCNC: 4.6 MG/DL — HIGH (ref 1.6–2.6)

## 2024-10-31 RX ORDER — IBUPROFEN 200 MG
600 TABLET ORAL EVERY 6 HOURS
Refills: 0 | Status: DISCONTINUED | OUTPATIENT
Start: 2024-10-31 | End: 2024-11-01

## 2024-10-31 RX ADMIN — Medication 60 MILLIGRAM(S): at 17:52

## 2024-10-31 RX ADMIN — SODIUM CHLORIDE 3 MILLILITER(S): 9 INJECTION, SOLUTION INTRAMUSCULAR; INTRAVENOUS; SUBCUTANEOUS at 14:00

## 2024-10-31 RX ADMIN — SODIUM CHLORIDE 3 MILLILITER(S): 9 INJECTION, SOLUTION INTRAMUSCULAR; INTRAVENOUS; SUBCUTANEOUS at 06:00

## 2024-10-31 RX ADMIN — Medication 200 MILLIGRAM(S): at 06:00

## 2024-10-31 RX ADMIN — Medication 200 MILLIGRAM(S): at 22:00

## 2024-10-31 RX ADMIN — Medication 200 MILLIGRAM(S): at 14:09

## 2024-11-01 VITALS
RESPIRATION RATE: 18 BRPM | OXYGEN SATURATION: 99 % | HEART RATE: 84 BPM | DIASTOLIC BLOOD PRESSURE: 90 MMHG | TEMPERATURE: 98 F | SYSTOLIC BLOOD PRESSURE: 148 MMHG

## 2024-11-01 DIAGNOSIS — O14.10 SEVERE PRE-ECLAMPSIA, UNSPECIFIED TRIMESTER: ICD-10-CM

## 2024-11-01 RX ORDER — IBUPROFEN 200 MG
1 TABLET ORAL
Qty: 0 | Refills: 0 | DISCHARGE
Start: 2024-11-01

## 2024-11-01 RX ORDER — LABETALOL HCL 200 MG
1 TABLET ORAL
Qty: 0 | Refills: 0 | DISCHARGE
Start: 2024-11-01

## 2024-11-01 RX ADMIN — Medication 200 MILLIGRAM(S): at 05:54

## 2024-11-01 RX ADMIN — Medication 200 MILLIGRAM(S): at 14:25

## 2024-11-01 NOTE — PROVIDER CONTACT NOTE (OTHER) - SITUATION
Pt. w/ severe BP's on admission VS & orthostatic VS
Patient refused HELLP lab
1400 /90 hr 84
/110mHg 20 minutes after 10 mg Procardia IR given
BP at 2140- 174/96mmHg   BP at 2155- 167/96mmHg - at this time, FHR is discontinuous and RN at bedside adjusting transducer   BP at 2209- 144/83mmHg
Pt postpartum  , SPEC on mag. Orthostatic BPs of 153/93, 160/96, 174/103. provider contacted in regards of results. RN to repeat BP in 15min, if BP is not within severe range no action needed.

## 2024-11-01 NOTE — PROVIDER CONTACT NOTE (OTHER) - REASON
refused HELLP lab
Patient with severe range /110mmHg
two severe range BP
Orthostatic BPs
Pt. w/ severe BP's on admission VS & orthostatic VS
B/P 148/90 HR 84

## 2024-11-01 NOTE — PROVIDER CONTACT NOTE (OTHER) - NAME OF MD/NP/PA/DO NOTIFIED:
MD Maria Dolores Vaughn
Dr. Knox
ROGERIO Salas; MD Hatch
MD Hatch
MD Maria Dolores Vaughn
Dr. Vicky Jean

## 2024-11-01 NOTE — PROGRESS NOTE ADULT - ASSESSMENT
NP:  day 1 Progress Note:     Patient seen at bedside resting comfortably offers no current complaints. Ambulating and voiding without difficulty.  Passing flatus and tolerating regular diet.  Bonding well with .  Breastfeeding exclusively . Denies HA, CP, SOB, N/V/D, dizziness, palpitations,  worsening vaginal bleeding, or any other concerns.      Vital Signs Last 24 Hrs  T(C): 36.8 (30 Oct 2024 11:30), Max: 37.1 (30 Oct 2024 09:25)  T(F): 98.3 (30 Oct 2024 11:30), Max: 98.8 (30 Oct 2024 09:25)  HR: 101 (30 Oct 2024 11:30) (70 - 117)  BP: 114/62 (30 Oct 2024 11:30) (114/62 - 190/97)  BP(mean): --  RR: 17 (30 Oct 2024 11:30) (14 - 18)  SpO2: 97% (30 Oct 2024 11:30) (90% - 100%)    Parameters below as of 30 Oct 2024 11:30  Patient On (Oxygen Delivery Method): room air        Physical Exam:     Gen: A&Ox 3, NAD  Chest: CTA B/L  Cardiac: S1,S2; RRR  Breast: Soft, nontender, nonengorged  Abdomen: +BS, Soft, nontender, ND; Fundus firm below umbilicus  Gyn: mod lochia, intact/repaired  Ext: Nontender, DTRS 2+, no worsening edema                          10.1   6.43  )-----------( 304      ( 30 Oct 2024 01:40 )             32.7       A/P: 34yr old F PPD#0 s/p  10/30/2024 c/b PEC w/ SF     #PEC w/ SF   - Pt has multiple severe range BPs from (150-190s/80-100s)  - Pt has had multiple doses on Procardia IR due this hospitalization (see med recs)  - HELLP Labs- WNL   - 24hr urine 342  - Pt is on Procardia 60XL and Labetalol 200mg TID   - Pt is on Magnesium Sulfate for seizure precautions for 24hrs post delivery   - Pt educated on Preeclampsia/eclampsia and the recommendation of antihypertension medication and magnesium sulfate. Discussed not taking these medications and having sustained severe range BPs can have fatal consequences such a maternal mortality. Our concern is her safety. Pt verbalized understanding.   - Pt has BP cuff  - PEC resources provided        #PP   - Continue pain management  - Encourage OOB and ambulation  - Check CBC  - Continue current care    -d/w dr Holden Leiva NP     
Assessment and Plan  PPD #2 s/p   Doing well, bonding with baby  PEC w/ SF  ·	s/p magnesium sulfate  ·	continue labetalol and procardia as prescribed- rx sent to at home pharmacy through [facility] EMR  ·	continue to monitor bp's at home TID, keep logs and bring to each visit  ·	call for bp > 140/90  ·	go to hospital for bp >160/100  ·	hold labetalol for bp < 110/60  ·	PEC precautions reviewed  ·	pt has bp cuff and logs at home  Encourage ambulation.  PP & PPD Instructions reviewed.  Continue pain management as needed  D/C home today  RTO 3-4 days for bp check/PRN    plan discussed with Dr. ely

## 2024-11-01 NOTE — PROVIDER CONTACT NOTE (OTHER) - RECOMMENDATIONS
Continue to monitor BP q 15 minutes . Notify provider of elevated BP
as per Dr. Knox repeat b/p IN one hour. Patient refused repeat B/P check.
Inform MD and give pt. stat HTN med.
Administer 20mg Procardia . Repeat BP 20 minutes after administration
Review of BP results.

## 2024-11-01 NOTE — PROGRESS NOTE ADULT - SUBJECTIVE AND OBJECTIVE BOX
Post-partum Note,   She is a  34y woman who is now post-partum day: 1    Subjective:  The patient feels well.  She is ambulating.   She is tolerating regular diet.  She denies nausea and vomiting; denies fever.  She is voiding.  Her pain is controlled.  She reports normal postpartum bleeding.  She denies HA, blurry vision, vision changes.   Denies abd pain.     Physical exam:    Vital Signs Last 24 Hrs  T(C): 36.7 (31 Oct 2024 09:41), Max: 36.9 (30 Oct 2024 15:37)  T(F): 98 (31 Oct 2024 09:41), Max: 98.4 (30 Oct 2024 15:37)  HR: 84 (31 Oct 2024 09:41) (75 - 99)  BP: 137/84 (31 Oct 2024 09:41) (111/59 - 137/84)  BP(mean): --  RR: 18 (31 Oct 2024 09:41) (17 - 18)  SpO2: 99% (31 Oct 2024 09:41) (97% - 99%)    Parameters below as of 31 Oct 2024 09:41  Patient On (Oxygen Delivery Method): room air        Gen: NAD  Breast: Soft, nontender, not engorged.  Abdomen: Soft, nontender, no distension , firm uterine fundus at umbilicus.  Pelvic: Normal lochia noted  Ext: No calf tenderness    LABS:                        10.1   6.43  )-----------( 304      ( 30 Oct 2024 01:40 )             32.7       Rubella status:     Allergies    No Known Allergies    Intolerances      MEDICATIONS  (STANDING):  acetaminophen     Tablet .. 975 milliGRAM(s) Oral <User Schedule>  diphtheria/tetanus/pertussis (acellular) Vaccine (Adacel) 0.5 milliLiter(s) IntraMuscular once  ibuprofen  Tablet. 600 milliGRAM(s) Oral every 6 hours  influenza   Vaccine 0.5 milliLiter(s) IntraMuscular once  labetalol 200 milliGRAM(s) Oral three times a day  lactated ringers. 1000 milliLiter(s) (50 mL/Hr) IV Continuous <Continuous>  NIFEdipine XL 60 milliGRAM(s) Oral daily  prenatal multivitamin 1 Tablet(s) Oral daily  sodium chloride 0.9% lock flush 3 milliLiter(s) IV Push every 8 hours    MEDICATIONS  (PRN):  benzocaine 20%/menthol 0.5% Spray 1 Spray(s) Topical every 6 hours PRN for Perineal discomfort  dibucaine 1% Ointment 1 Application(s) Topical every 6 hours PRN Perineal discomfort  diphenhydrAMINE 25 milliGRAM(s) Oral every 6 hours PRN Pruritus  hydrocortisone 1% Cream 1 Application(s) Topical every 6 hours PRN Moderate Pain (4-6)  lanolin Ointment 1 Application(s) Topical every 6 hours PRN nipple soreness  magnesium hydroxide Suspension 30 milliLiter(s) Oral two times a day PRN Constipation  oxyCODONE    IR 5 milliGRAM(s) Oral every 3 hours PRN Moderate to Severe Pain (4-10)  oxyCODONE    IR 5 milliGRAM(s) Oral once PRN Moderate to Severe Pain (4-10)  pramoxine 1%/zinc 5% Cream 1 Application(s) Topical every 4 hours PRN Moderate Pain (4-6)  simethicone 80 milliGRAM(s) Chew every 4 hours PRN Gas  witch hazel Pads 1 Application(s) Topical every 4 hours PRN Perineal discomfort        Assessment and Plan  PPD #1 s/p  c/b PEC with SF    #PP  Doing well.  Encourage ambulation.  PP & PPD Instructions reviewed.  PP education materials provided,   Breastfeeding Encouraged.  CPC.    #PEC   S/P MAG  Cont Labetolol 200 tid and procadia 60 XL  PEC counselling  PEC prec reviewed  BP logs given  BP parameters reviewed  Advised repeat HELLP labs, patient refused. Had counselling by resident, nurse, and myself. Spoke with Dr. Taylor about refusal- states OK.   Will try for repeat HELLP labs in AM- order placed       Plan for D/C home tomorrow.  Plan reviewed with Dr. Taylor       
Post-partum Note,   She is a  34y woman who is now post-partum day: 2    Subjective:  The patient feels well.  She is ambulating.   She is tolerating regular diet.  She denies nausea and vomiting; denies fever.  She is voiding.  Her pain is controlled.  She reports normal postpartum bleeding.  She is breastfeeding and formula feeding.  She denies HA, blurry vision, epigastric pain, SOB, CP, dizziness or lightheadedness    Physical exam:    Vital Signs Last 24 Hrs  T(C): 36.7 (2024 10:07), Max: 36.9 (31 Oct 2024 17:54)  T(F): 98.1 (2024 10:07), Max: 98.4 (31 Oct 2024 17:54)  HR: 90 (2024 10:07) (72 - 90)  BP: 132/84 (2024 10:07) (122/78 - 147/87)  BP(mean): --  RR: 18 (2024 10:07) (16 - 18)  SpO2: 99% (2024 10:07) (98% - 100%)    Parameters below as of 2024 05:03  Patient On (Oxygen Delivery Method): room air        Gen: NAD  Breast: Soft, nontender, not engorged.  Abdomen: Soft, nontender, no distension , firm uterine fundus at umbilicus.  Pelvic: Normal lochia noted  Ext: No calf tenderness      Allergies    No Known Allergies        MEDICATIONS  (STANDING):  acetaminophen     Tablet .. 975 milliGRAM(s) Oral <User Schedule>  diphtheria/tetanus/pertussis (acellular) Vaccine (Adacel) 0.5 milliLiter(s) IntraMuscular once  ibuprofen  Tablet. 600 milliGRAM(s) Oral every 6 hours  influenza   Vaccine 0.5 milliLiter(s) IntraMuscular once  labetalol 200 milliGRAM(s) Oral three times a day  lactated ringers. 1000 milliLiter(s) (50 mL/Hr) IV Continuous <Continuous>  NIFEdipine XL 60 milliGRAM(s) Oral daily  prenatal multivitamin 1 Tablet(s) Oral daily  sodium chloride 0.9% lock flush 3 milliLiter(s) IV Push every 8 hours    MEDICATIONS  (PRN):  benzocaine 20%/menthol 0.5% Spray 1 Spray(s) Topical every 6 hours PRN for Perineal discomfort  dibucaine 1% Ointment 1 Application(s) Topical every 6 hours PRN Perineal discomfort  diphenhydrAMINE 25 milliGRAM(s) Oral every 6 hours PRN Pruritus  hydrocortisone 1% Cream 1 Application(s) Topical every 6 hours PRN Moderate Pain (4-6)  lanolin Ointment 1 Application(s) Topical every 6 hours PRN nipple soreness  magnesium hydroxide Suspension 30 milliLiter(s) Oral two times a day PRN Constipation  oxyCODONE    IR 5 milliGRAM(s) Oral every 3 hours PRN Moderate to Severe Pain (4-10)  oxyCODONE    IR 5 milliGRAM(s) Oral once PRN Moderate to Severe Pain (4-10)  pramoxine 1%/zinc 5% Cream 1 Application(s) Topical every 4 hours PRN Moderate Pain (4-6)  simethicone 80 milliGRAM(s) Chew every 4 hours PRN Gas  witch hazel Pads 1 Application(s) Topical every 4 hours PRN Perineal discomfort

## 2025-01-27 NOTE — OB PROVIDER TRIAGE NOTE - NS_GBS_INFANT_INVASIVE_OBGYN_ALL_OB_FT
----- Message from MARIAN CLOUD RN sent at 1/27/2025  2:41 PM EST -----  Patient of Dr. Javier.  Patient needs a refill on her prescription bumex 2 mg daily. Thanks   N/A